# Patient Record
Sex: FEMALE | Race: WHITE | Employment: UNEMPLOYED | URBAN - METROPOLITAN AREA
[De-identification: names, ages, dates, MRNs, and addresses within clinical notes are randomized per-mention and may not be internally consistent; named-entity substitution may affect disease eponyms.]

---

## 2018-07-25 ENCOUNTER — APPOINTMENT (OUTPATIENT)
Dept: CT IMAGING | Age: 43
DRG: 254 | End: 2018-07-25
Attending: INTERNAL MEDICINE
Payer: MEDICAID

## 2018-07-25 ENCOUNTER — HOSPITAL ENCOUNTER (INPATIENT)
Age: 43
LOS: 6 days | Discharge: HOME | DRG: 254 | End: 2018-07-31
Attending: INTERNAL MEDICINE | Admitting: INTERNAL MEDICINE
Payer: MEDICAID

## 2018-07-25 ENCOUNTER — APPOINTMENT (OUTPATIENT)
Dept: NUCLEAR MEDICINE | Age: 43
DRG: 254 | End: 2018-07-25
Attending: INTERNAL MEDICINE
Payer: MEDICAID

## 2018-07-25 ENCOUNTER — APPOINTMENT (OUTPATIENT)
Dept: GENERAL RADIOLOGY | Age: 43
DRG: 254 | End: 2018-07-25
Attending: INTERNAL MEDICINE
Payer: MEDICAID

## 2018-07-25 DIAGNOSIS — D64.9 ANEMIA, UNSPECIFIED TYPE: ICD-10-CM

## 2018-07-25 DIAGNOSIS — K62.5 RECTAL BLEEDING: Primary | ICD-10-CM

## 2018-07-25 DIAGNOSIS — I95.9 TRANSIENT HYPOTENSION: ICD-10-CM

## 2018-07-25 LAB
ALBUMIN SERPL-MCNC: 3.1 G/DL (ref 3.4–5)
ALBUMIN/GLOB SERPL: 1 {RATIO} (ref 0.8–1.7)
ALP SERPL-CCNC: 51 U/L (ref 45–117)
ALT SERPL-CCNC: 14 U/L (ref 13–56)
AMPHET UR QL SCN: NEGATIVE
ANION GAP SERPL CALC-SCNC: 6 MMOL/L (ref 3–18)
APAP SERPL-MCNC: <2 UG/ML (ref 10–30)
APPEARANCE UR: ABNORMAL
APTT PPP: 33.9 SEC (ref 23–36.4)
AST SERPL-CCNC: 11 U/L (ref 15–37)
BACTERIA URNS QL MICRO: ABNORMAL /HPF
BARBITURATES UR QL SCN: NEGATIVE
BASOPHILS # BLD: 0 K/UL (ref 0–0.1)
BASOPHILS NFR BLD: 0 % (ref 0–2)
BENZODIAZ UR QL: NEGATIVE
BILIRUB SERPL-MCNC: 0.5 MG/DL (ref 0.2–1)
BILIRUB UR QL: NEGATIVE
BUN SERPL-MCNC: 11 MG/DL (ref 7–18)
BUN/CREAT SERPL: 12 (ref 12–20)
CALCIUM SERPL-MCNC: 8.6 MG/DL (ref 8.5–10.1)
CANNABINOIDS UR QL SCN: POSITIVE
CHLORIDE SERPL-SCNC: 104 MMOL/L (ref 100–108)
CK MB CFR SERPL CALC: 2 % (ref 0–4)
CK MB SERPL-MCNC: 1.1 NG/ML (ref 5–25)
CK SERPL-CCNC: 55 U/L (ref 26–192)
CO2 SERPL-SCNC: 28 MMOL/L (ref 21–32)
COCAINE UR QL SCN: POSITIVE
COLOR UR: ABNORMAL
CREAT SERPL-MCNC: 0.9 MG/DL (ref 0.6–1.3)
DIFFERENTIAL METHOD BLD: ABNORMAL
EOSINOPHIL # BLD: 0.1 K/UL (ref 0–0.4)
EOSINOPHIL NFR BLD: 1 % (ref 0–5)
EPITH CASTS URNS QL MICRO: ABNORMAL /LPF (ref 0–5)
ERYTHROCYTE [DISTWIDTH] IN BLOOD BY AUTOMATED COUNT: 15.9 % (ref 11.6–14.5)
ETHANOL SERPL-MCNC: <3 MG/DL (ref 0–3)
GLOBULIN SER CALC-MCNC: 3 G/DL (ref 2–4)
GLUCOSE SERPL-MCNC: 130 MG/DL (ref 74–99)
GLUCOSE UR STRIP.AUTO-MCNC: 100 MG/DL
HCG SERPL QL: NEGATIVE
HCT VFR BLD AUTO: 19 % (ref 35–45)
HCT VFR BLD AUTO: 28.9 % (ref 35–45)
HCT VFR BLD AUTO: 31.6 % (ref 35–45)
HDSCOM,HDSCOM: ABNORMAL
HGB BLD-MCNC: 10 G/DL (ref 12–16)
HGB BLD-MCNC: 6 G/DL (ref 12–16)
HGB BLD-MCNC: 9 G/DL (ref 12–16)
HGB UR QL STRIP: ABNORMAL
INR PPP: 1.1 (ref 0.8–1.2)
KETONES UR QL STRIP.AUTO: NEGATIVE MG/DL
LEUKOCYTE ESTERASE UR QL STRIP.AUTO: ABNORMAL
LYMPHOCYTES # BLD: 1.9 K/UL (ref 0.9–3.6)
LYMPHOCYTES NFR BLD: 25 % (ref 21–52)
MAGNESIUM SERPL-MCNC: 1.8 MG/DL (ref 1.6–2.6)
MCH RBC QN AUTO: 25.6 PG (ref 24–34)
MCHC RBC AUTO-ENTMCNC: 31.6 G/DL (ref 31–37)
MCV RBC AUTO: 80.8 FL (ref 74–97)
METHADONE UR QL: NEGATIVE
MONOCYTES # BLD: 0.8 K/UL (ref 0.05–1.2)
MONOCYTES NFR BLD: 10 % (ref 3–10)
NEUTS SEG # BLD: 4.9 K/UL (ref 1.8–8)
NEUTS SEG NFR BLD: 64 % (ref 40–73)
NITRITE UR QL STRIP.AUTO: NEGATIVE
OPIATES UR QL: NEGATIVE
PCP UR QL: NEGATIVE
PH UR STRIP: 7 [PH] (ref 5–8)
PLATELET # BLD AUTO: 325 K/UL (ref 135–420)
PMV BLD AUTO: 9.1 FL (ref 9.2–11.8)
POTASSIUM SERPL-SCNC: 3.5 MMOL/L (ref 3.5–5.5)
PROT SERPL-MCNC: 6.1 G/DL (ref 6.4–8.2)
PROT UR STRIP-MCNC: >1000 MG/DL
PROTHROMBIN TIME: 13.3 SEC (ref 11.5–15.2)
RBC # BLD AUTO: 3.91 M/UL (ref 4.2–5.3)
RBC #/AREA URNS HPF: ABNORMAL /HPF (ref 0–5)
SALICYLATES SERPL-MCNC: 3 MG/DL (ref 2.8–20)
SODIUM SERPL-SCNC: 138 MMOL/L (ref 136–145)
SP GR UR REFRACTOMETRY: >1.03 (ref 1–1.03)
TROPONIN I SERPL-MCNC: <0.02 NG/ML (ref 0–0.06)
UROBILINOGEN UR QL STRIP.AUTO: 0.2 EU/DL (ref 0.2–1)
WBC # BLD AUTO: 7.6 K/UL (ref 4.6–13.2)
WBC URNS QL MICRO: ABNORMAL /HPF (ref 0–5)

## 2018-07-25 PROCEDURE — 74011250636 HC RX REV CODE- 250/636: Performed by: INTERNAL MEDICINE

## 2018-07-25 PROCEDURE — 36430 TRANSFUSION BLD/BLD COMPNT: CPT

## 2018-07-25 PROCEDURE — 85025 COMPLETE CBC W/AUTO DIFF WBC: CPT | Performed by: INTERNAL MEDICINE

## 2018-07-25 PROCEDURE — 86900 BLOOD TYPING SEROLOGIC ABO: CPT | Performed by: INTERNAL MEDICINE

## 2018-07-25 PROCEDURE — 30233N1 TRANSFUSION OF NONAUTOLOGOUS RED BLOOD CELLS INTO PERIPHERAL VEIN, PERCUTANEOUS APPROACH: ICD-10-PCS | Performed by: INTERNAL MEDICINE

## 2018-07-25 PROCEDURE — 80307 DRUG TEST PRSMV CHEM ANLYZR: CPT | Performed by: INTERNAL MEDICINE

## 2018-07-25 PROCEDURE — 81001 URINALYSIS AUTO W/SCOPE: CPT | Performed by: INTERNAL MEDICINE

## 2018-07-25 PROCEDURE — C9113 INJ PANTOPRAZOLE SODIUM, VIA: HCPCS | Performed by: INTERNAL MEDICINE

## 2018-07-25 PROCEDURE — 75810000137 HC PLCMT CENT VENOUS CATH

## 2018-07-25 PROCEDURE — 93005 ELECTROCARDIOGRAM TRACING: CPT

## 2018-07-25 PROCEDURE — 82550 ASSAY OF CK (CPK): CPT | Performed by: INTERNAL MEDICINE

## 2018-07-25 PROCEDURE — 84703 CHORIONIC GONADOTROPIN ASSAY: CPT | Performed by: INTERNAL MEDICINE

## 2018-07-25 PROCEDURE — 74011636320 HC RX REV CODE- 636/320: Performed by: INTERNAL MEDICINE

## 2018-07-25 PROCEDURE — 85018 HEMOGLOBIN: CPT | Performed by: INTERNAL MEDICINE

## 2018-07-25 PROCEDURE — 83735 ASSAY OF MAGNESIUM: CPT | Performed by: INTERNAL MEDICINE

## 2018-07-25 PROCEDURE — P9016 RBC LEUKOCYTES REDUCED: HCPCS | Performed by: INTERNAL MEDICINE

## 2018-07-25 PROCEDURE — 80053 COMPREHEN METABOLIC PANEL: CPT | Performed by: INTERNAL MEDICINE

## 2018-07-25 PROCEDURE — 86920 COMPATIBILITY TEST SPIN: CPT | Performed by: INTERNAL MEDICINE

## 2018-07-25 PROCEDURE — C1751 CATH, INF, PER/CENT/MIDLINE: HCPCS

## 2018-07-25 PROCEDURE — 74011000250 HC RX REV CODE- 250: Performed by: INTERNAL MEDICINE

## 2018-07-25 PROCEDURE — 99285 EMERGENCY DEPT VISIT HI MDM: CPT

## 2018-07-25 PROCEDURE — 85610 PROTHROMBIN TIME: CPT | Performed by: INTERNAL MEDICINE

## 2018-07-25 PROCEDURE — 02HV33Z INSERTION OF INFUSION DEVICE INTO SUPERIOR VENA CAVA, PERCUTANEOUS APPROACH: ICD-10-PCS | Performed by: INTERNAL MEDICINE

## 2018-07-25 PROCEDURE — 74174 CTA ABD&PLVS W/CONTRAST: CPT

## 2018-07-25 PROCEDURE — 71045 X-RAY EXAM CHEST 1 VIEW: CPT

## 2018-07-25 PROCEDURE — 65610000006 HC RM INTENSIVE CARE

## 2018-07-25 PROCEDURE — 85730 THROMBOPLASTIN TIME PARTIAL: CPT | Performed by: INTERNAL MEDICINE

## 2018-07-25 RX ORDER — PANTOPRAZOLE SODIUM 40 MG/10ML
40 INJECTION, POWDER, LYOPHILIZED, FOR SOLUTION INTRAVENOUS
Status: DISCONTINUED | OUTPATIENT
Start: 2018-07-25 | End: 2018-07-25

## 2018-07-25 RX ORDER — SODIUM CHLORIDE 9 MG/ML
250 INJECTION, SOLUTION INTRAVENOUS AS NEEDED
Status: DISCONTINUED | OUTPATIENT
Start: 2018-07-25 | End: 2018-07-31 | Stop reason: HOSPADM

## 2018-07-25 RX ORDER — SODIUM CHLORIDE 9 MG/ML
100 INJECTION, SOLUTION INTRAVENOUS CONTINUOUS
Status: DISCONTINUED | OUTPATIENT
Start: 2018-07-25 | End: 2018-07-27

## 2018-07-25 RX ORDER — DIPHENHYDRAMINE HCL 25 MG
25 CAPSULE ORAL
Status: DISCONTINUED | OUTPATIENT
Start: 2018-07-25 | End: 2018-07-31 | Stop reason: HOSPADM

## 2018-07-25 RX ADMIN — IOPAMIDOL 100 ML: 755 INJECTION, SOLUTION INTRAVENOUS at 18:32

## 2018-07-25 RX ADMIN — SODIUM CHLORIDE 1000 ML: 900 INJECTION, SOLUTION INTRAVENOUS at 20:10

## 2018-07-25 RX ADMIN — SODIUM CHLORIDE 1000 ML: 900 INJECTION, SOLUTION INTRAVENOUS at 15:44

## 2018-07-25 RX ADMIN — SODIUM CHLORIDE 40 MG: 9 INJECTION INTRAMUSCULAR; INTRAVENOUS; SUBCUTANEOUS at 15:46

## 2018-07-25 RX ADMIN — SODIUM CHLORIDE 125 ML/HR: 900 INJECTION, SOLUTION INTRAVENOUS at 21:23

## 2018-07-25 NOTE — IP AVS SNAPSHOT
Ashley Serrano 
 
 
 [address] [facility] [address] 
[phone] Patient: Guillermo Moss MRN: [id number] CAMILA:[birth year]99[birth year]79 A check hyacinth indicates which time of day the medication should be taken. My Medications Notice You have not been prescribed any medications.
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
## 2018-07-25 NOTE — CONSULTS
[facility]  Pulmonary, Critical Care, and Sleep Medicine    Name: Scarlet Nguyen MRN: [id number]   : 1975 Hospital: [facility] [place]   Date: 2018        Pulmonary Critical Care Initial Patient Consult                                              IMPRESSION:   · Lower GI bleeding  · Anemia  · Hx of seizure, took herself off of medications  · Hx of substance abuse  · Hx of right sided pneumothorax  · Nicotine dependence   RECOMMENDATIONS:   Compensated respiratory status. On room air. May use supplemental O2 via NC for goal SPO2> 90%  Aspiration prevention bundle, head of the bed at 30' all times, pulmonary hygiene care  Check CTA abdomen and pelvis if not then consider bleeding scan  Get Gi consultation. C diff ordered  Monitor H/H now and every 6 hrs  Aggressive IV Fluids resuscitation and transfuse PRBC per clinical course  Monitor hemodynamics, UO  Stress ulcer prophylaxis - PPI  DVT prophylaxis - SCDs, avoid pharmacological prophylaxis   AM labs. Diet - NPO   Agree to monitor patient in ICU  Will defer respective systems problem management to primary and other consultant and follow patient in ICU with primary and other medical team  Further recommendations will be based on the patient's response to recommended treatment and results of the investigation ordered. Quality Care: PPI, DVT prophylaxis, HOB elevated, Infection control all reviewed and addressed. PAIN AND SEDATION: none  · Skin/Wound: none  · Nutrition: NPO for now  · Prophylaxis: DVT and GI Prophylaxis reviewed. · Restraints: none  · PT/OT eval and treat: as needed   · Lines/Tubes: lines PIV, durham none, Ventilator none  ADVANCE DIRECTIVE: Full code  FAMILY DISCUSSION: Spoke with patient  Events and notes from last 24 hours reviewed. Care plan discussed with nursing      Subjective/History:   Ms. Scarlet Nguyen has been seen and evaluated as Livier Boyle and Phuong Francois requested now for LGIB with bright red blood per rectum, multiple episodes. Patient is a 37 y.o. female with PMhx for seizure d/o, substance abuse, non-adherence to medication presented to ED with finding of BRBPR. In ED, she had multiple more BM with BRBPR. Some abdominal cramping when she feels BM coming up and relieved after BM. Labs showed some lowering of Hgb compared to her labs in 2017 at [facility]. No prior similar episode, no trauma or sexual abuse, use of foreign body in rectum, hx of PUD or cancer, use of NSAIDs or blood thinner, alcohol abuse, liver disease, fever, chills, abdominal pain, anorexia, nausea or vomiting, dysphagia, recent change in bowel habits or black or bloody stools or weight loss or antibiotics use. She reports last night she spent with a male friend at his house and drank some coffee. She does not recall details of last night events. Further work up is pending. Review of Systems:  Pertinent items are noted in HPI. Past Medical History:  Past Medical History:   Diagnosis Date    History of appendectomy     SAH [facility]arachnoid hemorrhage) ([facility] [address]     Substance abuse         Past Surgical History:  Past Surgical History:   Procedure Laterality Date    HX APPENDECTOMY      HX  SECTION      HX TRACHEOSTOMY          Medications:  Prior to Admission medications    Not on File       Current Facility-Administered Medications   Medication Dose Route Frequency    sodium chloride 0.9 % bolus infusion 1,000 mL  1,000 mL IntraVENous ONCE    sodium chloride 0.9 % bolus infusion 1,000 mL  1,000 mL IntraVENous ONCE    pantoprazole (PROTONIX) 40 mg in sodium chloride 0.9% 10 mL injection  40 mg IntraVENous NOW       Allergy:  No Known Allergies     Social History:  Social History   Substance Use Topics    Smoking status: Current Every Day Smoker     Packs/day: 1.00    Smokeless tobacco: Never Used    Alcohol use Yes        Family History:  History reviewed. Family history of mother with cancer of unknown type.       Objective: Vital Signs:    Blood pressure 122/75, pulse 85, temperature 97.7 °F (36.5 °C), resp. rate 16, height 5' 9\" (1.753 m), weight 49.9 kg (110 lb), last menstrual period 2018, SpO2 100 %. Body mass index is 16.24 kg/(m^2). O2 Device: Room air       Temp (24hrs), Av.7 °F (36.5 °C), Min:97.7 °F (36.5 °C), Max:97.7 °F (36.5 °C)     Patient Vitals for the past 8 hrs:   Temp Pulse Resp BP SpO2   18 1300 97.7 °F (36.5 °C) 85 16 122/75 100 %     Intake/Output:   Last shift:         Last 3 shifts:    No intake or output data in the 24 hours ending 18 1521      Physical Exam:  General: AAO x 3, in no respiratory distress and acyanotic, alert, cooperative, no distress, appears stated age  [de-identified]: PERRLA, EOMI, fundi benign, throat normal without erythema or exudate  Neck: No abnormally enlarged lymph nodes or thyroid, supple  Chest: normal  Lungs: normal air entry, clear to auscultation bilaterally, normal percussion bilaterally, no tenderness/ rash  Heart: Regular rate and rhythm, S1S2 present or without murmur or extra heart sounds  Abdomen: non distended, bowel sounds normoactive, tympanic, abdomen is soft, some sensitivity in both lower quadrants, no significant tenderness, masses, organomegaly or guarding, rigidity, rebound  Extremity: negative for edema, cyanosis, clubbing  Capillary refill: normal  Neuro: alert, oriented x 3, no defects noted in general exam.  Skin: Skin color, texture, turgor fair.  Skin dry, non-diaphoretic    Data:     Recent Results (from the past 24 hour(s))   CBC WITH AUTOMATED DIFF    Collection Time: 18  1:15 PM   Result Value Ref Range    WBC 7.6 4.6 - 13.2 K/uL    RBC 3.91 (L) 4.20 - 5.30 M/uL    HGB 10.0 (L) 12.0 - 16.0 g/dL    HCT 31.6 (L) 35.0 - 45.0 %    MCV 80.8 74.0 - 97.0 FL    MCH 25.6 24.0 - 34.0 PG    MCHC 31.6 31.0 - 37.0 g/dL    RDW 15.9 (H) 11.6 - 14.5 %    PLATELET 694 933 - 943 K/uL    MPV 9.1 (L) 9.2 - 11.8 FL    NEUTROPHILS 64 40 - 73 % LYMPHOCYTES 25 21 - 52 %    MONOCYTES 10 3 - 10 %    EOSINOPHILS 1 0 - 5 %    BASOPHILS 0 0 - 2 %    ABS. NEUTROPHILS 4.9 1.8 - 8.0 K/UL    ABS. LYMPHOCYTES 1.9 0.9 - 3.6 K/UL    ABS. MONOCYTES 0.8 0.05 - 1.2 K/UL    ABS. EOSINOPHILS 0.1 0.0 - 0.4 K/UL    ABS. BASOPHILS 0.0 0.0 - 0.1 K/UL    DF AUTOMATED     METABOLIC PANEL, COMPREHENSIVE    Collection Time: 07/25/18  1:15 PM   Result Value Ref Range    Sodium 138 136 - 145 mmol/L    Potassium 3.5 3.5 - 5.5 mmol/L    Chloride 104 100 - 108 mmol/L    CO2 28 21 - 32 mmol/L    Anion gap 6 3.0 - 18 mmol/L    Glucose 130 (H) 74 - 99 mg/dL    BUN 11 7.0 - 18 MG/DL    Creatinine 0.90 0.6 - 1.3 MG/DL    BUN/Creatinine ratio 12 12 - 20      GFR est AA >60 >60 ml/min/1.73m2    GFR est non-AA >60 >60 ml/min/1.73m2    Calcium 8.6 8.5 - 10.1 MG/DL    Bilirubin, total 0.5 0.2 - 1.0 MG/DL    ALT (SGPT) 14 13 - 56 U/L    AST (SGOT) 11 (L) 15 - 37 U/L    Alk.  phosphatase 51 45 - 117 U/L    Protein, total 6.1 (L) 6.4 - 8.2 g/dL    Albumin 3.1 (L) 3.4 - 5.0 g/dL    Globulin 3.0 2.0 - 4.0 g/dL    A-G Ratio 1.0 0.8 - 1.7     MAGNESIUM    Collection Time: 07/25/18  1:15 PM   Result Value Ref Range    Magnesium 1.8 1.6 - 2.6 mg/dL   CARDIAC PANEL,(CK, CKMB & TROPONIN)    Collection Time: 07/25/18  1:15 PM   Result Value Ref Range    CK 55 26 - 192 U/L    CK - MB 1.1 <3.6 ng/ml    CK-MB Index 2.0 0.0 - 4.0 %    Troponin-I, Qt. <0.02 0.00 - 0.06 NG/ML   PROTHROMBIN TIME + INR    Collection Time: 07/25/18  1:15 PM   Result Value Ref Range    Prothrombin time 13.3 11.5 - 15.2 sec    INR 1.1 0.8 - 1.2     PTT    Collection Time: 07/25/18  1:15 PM   Result Value Ref Range    aPTT 33.9 23.0 - 36.4 SEC   ETHYL ALCOHOL    Collection Time: 07/25/18  1:15 PM   Result Value Ref Range    ALCOHOL(ETHYL),SERUM <3 0 - 3 MG/DL   HCG QL SERUM    Collection Time: 07/25/18  1:15 PM   Result Value Ref Range    HCG, Ql. NEGATIVE  NEG     SALICYLATE    Collection Time: 07/25/18  1:15 PM   Result Value Ref Range Salicylate level 3.0 2.8 - 20 MG/DL   TYPE & SCREEN    Collection Time: 07/25/18  1:30 PM   Result Value Ref Range    Crossmatch Expiration 07/28/2018     ABO/Rh(D) O POSITIVE     Antibody screen NEG            No results for input(s): FIO2I, IFO2, HCO3I, IHCO3, HCOPOC, PCO2I, PCOPOC, IPHI, PHI, PHPOC, PO2I, PO2POC in the last 72 hours. No lab exists for component: IPOC2    All Micro Results     None          Telemetry: normal sinus rhythm      [x]See my orders for details    My assessment, plan of care, findings, medications, side effects etc were discussed with:  [x]nursing []PT/OT    []respiratory therapy [x]Dr. Janie Hearn [x]Patient     [x]Total critical care time exclusive of procedures 50 minutes with complex decision making performed and > 50% time spent in face to face evaluation. Ricardo William MD    Addendum 4:45 pm  Came to check on the patient. In between patient had several more bloody bowel movements with total now 6-7 or more, SBP dropped to 97 mm Hg and after IVF bolus 100 mm Hg. Given the ongoing bleeding with drop in Hgb and relative changes in BP, patient would benefit from PRBC transfusion. Discussed with patient. All questions were answered. She agrees. Order for transfusion placed on the chart. Ricardo William MD     Addendum 6 pm  Repeat Hgb with further drop to 9. Patient continued with another large BRBPR and dropped SBP to 67 mm Hg. Patient has one intermittently working PIV and another one not working per [place]. IVF bolus started and could bring SBP to 77 mm Hg and trouble with getting IV fluid in. Spoke with patient regarding CVC placement.  Standard discussion of CVC placement alternative, risk/complication with the patient includes but not limited to bleeding, infection, not able to find anything, injury to lungs and nearby organs, need for chest tube for air leak, need for more procedures/ surgeries, respiratory failure, medications side effects, discomfort and any unforeseen adverse events. All questions answered. Patient consented for CVC placement. Please see separate procedure note. SBP with IVF up to 88-92 mm Hg. Awaiting PRBC from blood bank. Lorie Do MD     Addendum 10:10 pm  CTA abdomen pelvis followed. RN earlier has been instructed to check with GI regarding clarification on NM bleeding scan. Patient going to receive PRBC transfusion. Has instructed RN to transfuse 1 unit then check Hgb and transfuse 2nd unit for either ongoing bleeding episodes, hemodynamic instability and or Hgb < 7.     Lorie Do MD

## 2018-07-25 NOTE — PROGRESS NOTES
Chart reviewed. Pt currently in ED awaiting hospital admission for anemia. CM will follow for discharge planning needs.     Care Management Interventions  Transition of Care Consult (CM Consult): Discharge Planning

## 2018-07-25 NOTE — IP AVS SNAPSHOT
[address] [address] [place] [address] 
[phone] Patient: Cozette Castleman MRN: [id number] RUJ:9/87/9260 About your hospitalization You were admitted on:  July 25, 2018 You last received care in the:  [address] [place] [address] You were discharged on:  July 31, 2018 Why you were hospitalized Your primary diagnosis was:  Rectal Bleed Your diagnoses also included:  Anemia, Transient Hypotension, Substance Abuse, Smoker, Adjustment Disorder With Mixed Anxiety And Depressed Mood, Hypomagnesemia, Hypophosphatemia, Cellulitis Of Right Arm, Vaginal Irritation Follow-up Information Follow up With Details Comments Contact Info Caroline Hernandez MD   [address] [place] [address] [address] [place] [address] 
[phone] None   None (395) Patient stated that they have no PCP 
  
 pcm  In 3 days Discharge Orders None A check hyacinth indicates which time of day the medication should be taken. My Medications Notice You have not been prescribed any medications. Discharge Instructions Anemia: Care Instructions Your Care Instructions Anemia is a low level of red blood cells, which carry oxygen throughout your body. Many things can cause anemia. Lack of iron is one of the most common causes. Your body needs iron to make hemoglobin, a substance in red blood cells that carries oxygen from the lungs to your body's cells. Without enough iron, the body produces fewer and smaller red blood cells. As a result, your body's cells do not get enough oxygen, and you feel tired and weak. And you may have trouble concentrating. Bleeding is the most common cause of a lack of iron. You may have heavy menstrual bleeding or bleeding caused by conditions such as ulcers, hemorrhoids, or cancer.  Regular use of aspirin or other anti-inflammatory medicines (such as ibuprofen) also can cause bleeding in some people. A lack of iron in your diet also can cause anemia, especially at times when the body needs more iron, such as during pregnancy, infancy, and the teen years. Your doctor may have prescribed iron pills. It may take several months of treatment for your iron levels to return to normal. Your doctor also may suggest that you eat foods that are rich in iron, such as meat and beans. There are many other causes of anemia. It is not always due to a lack of iron. Finding the specific cause of your anemia will help your doctor find the right treatment for you. Follow-up care is a key part of your treatment and safety. Be sure to make and go to all appointments, and call your doctor if you are having problems. It's also a good idea to know your test results and keep a list of the medicines you take. How can you care for yourself at home? · Take your medicines exactly as prescribed. Call your doctor if you think you are having a problem with your medicine. · If your doctor recommends iron pills, take them as directed: ¨ Try to take the pills on an empty stomach about 1 hour before or 2 hours after meals. But you may need to take iron with food to avoid an upset stomach. ¨ Do not take antacids or drink milk or caffeine drinks (such as coffee, tea, or cola) at the same time or within 2 hours of the time that you take your iron. They can make it hard for your body to absorb the iron. ¨ Vitamin C (from food or supplements) helps your body absorb iron. Try taking iron pills with a glass of orange juice or some other food that is high in vitamin C, such as citrus fruits. ¨ Iron pills may cause stomach problems, such as heartburn, nausea, diarrhea, constipation, and cramps. Be sure to drink plenty of fluids, and include fruits, vegetables, and fiber in your diet each day. Iron pills often make your bowel movements dark or green. ¨ If you forget to take an iron pill, do not take a double dose of iron the next time you take a pill. ¨ Keep iron pills out of the reach of small children. An overdose of iron can be very dangerous. · Follow your doctor's advice about eating iron-rich foods. These include red meat, shellfish, poultry, eggs, beans, raisins, whole-grain bread, and leafy green vegetables. · Steam vegetables to help them keep their iron content. When should you call for help? Call 911 anytime you think you may need emergency care. For example, call if: 
  · You have symptoms of a heart attack. These may include: ¨ Chest pain or pressure, or a strange feeling in the chest. 
¨ Sweating. ¨ Shortness of breath. ¨ Nausea or vomiting. ¨ Pain, pressure, or a strange feeling in the back, neck, jaw, or upper belly or in one or both shoulders or arms. ¨ Lightheadedness or sudden weakness. ¨ A fast or irregular heartbeat. After you call 911, the  may tell you to chew 1 adult-strength or 2 to 4 low-dose aspirin. Wait for an ambulance. Do not try to drive yourself.  
  · You passed out (lost consciousness).  
 Call your doctor now or seek immediate medical care if: 
  · You have new or increased shortness of breath.  
  · You are dizzy or lightheaded, or you feel like you may faint.  
  · Your fatigue and weakness continue or get worse.  
  · You have any abnormal bleeding, such as: 
¨ Nosebleeds. ¨ Vaginal bleeding that is different (heavier, more frequent, at a different time of the month) than what you are used to. ¨ Bloody or black stools, or rectal bleeding. ¨ Bloody or pink urine.  
 Watch closely for changes in your health, and be sure to contact your doctor if: 
  · You do not get better as expected. Where can you learn more? Go to http://darron-viry.info/. Enter R301 in the search box to learn more about \"Anemia: Care Instructions. \" Current as of: October 9, 2017 Content Version: 11.7 © 2620-9570 Healthwise, Incorporated. Care instructions adapted under license by [employer] (which disclaims liability or warranty for this information). If you have questions about a medical condition or this instruction, always ask your healthcare professional. [address] any warranty or liability for your use of this information. DISCHARGE SUMMARY from Nurse PATIENT INSTRUCTIONS: 
 
 
F-face looks uneven A-arms unable to move or move unevenly S-speech slurred or non-existent T-time-call 911 as soon as signs and symptoms begin-DO NOT go Back to bed or wait to see if you get better-TIME IS BRAIN. Warning Signs of HEART ATTACK Call 911 if you have these symptoms: 
? Chest discomfort. Most heart attacks involve discomfort in the center of the chest that lasts more than a few minutes, or that goes away and comes back. It can feel like uncomfortable pressure, squeezing, fullness, or pain. ? Discomfort in other areas of the upper body. Symptoms can include pain or discomfort in one or both arms, the back, neck, jaw, or stomach. ? Shortness of breath with or without chest discomfort. ? Other signs may include breaking out in a cold sweat, nausea, or lightheadedness. Don't wait more than five minutes to call [address]! Fast action can save your life. Calling 911 is almost always the fastest way to get lifesaving treatment. Emergency Medical Services staff can begin treatment when they arrive  up to an hour sooner than if someone gets to the hospital by car. The discharge information has been reviewed with the patient. The patient verbalized understanding.  
Discharge medications reviewed with the patient and appropriate educational materials and side effects teaching were provided. ___________________________________________________________________________________________________________________________________ Introducing [facility]! Ole Gao introduces Rede[employer] patient portal. Now you can access parts of your medical record, email your doctor's office, and request medication refills online. 1. In your internet browser, go to [email or web address] 2. Click on the First Time User? Click Here link in the Sign In box. You will see the New Member Sign Up page. 3. Enter your Redeemia Access Code exactly as it appears below. You will not need to use this code after youve completed the sign-up process. If you do not sign up before the expiration date, you must request a new code. · Rede[employer] Access Code: [email or web address] Expires: 10/23/2018 12:55 PM 
 
4. Enter the last four digits of your Social Security Number (xxxx) and Date of Birth (mm/dd/yyyy) as indicated and click Submit. You will be taken to the next sign-up page. 5. Create a [email or web address] ID. This will be your Redeemia login ID and cannot be changed, so think of one that is secure and easy to remember. 6. Create a Redeemia password. You can change your password at any time. 7. Enter your Password Reset Question and Answer. This can be used at a later time if you forget your password. 8. Enter your e-mail address. You will receive e-mail notification when new information is available in [address] 9. Click Sign Up. You can now view and download portions of your medical record. 10. Click the Download Summary menu link to download a portable copy of your medical information. If you have questions, please visit the Frequently Asked Questions section of the Redeemia website. Remember, Red[employer] is NOT to be used for urgent needs. For medical emergencies, dial 911. Now available from your iPhone and Android! Introducing Sriram Chilel As a [facility] patient, I wanted to make you aware of our electronic visit tool called Sriram Chilel. [facility] 24/7 allows you to connect within minutes with a medical provider 24 hours a day, seven days a week via a mobile device or tablet or logging into a secure website from your computer. You can access Sriram Chilel from anywhere in the [place]. A virtual visit might be right for you when you have a simple condition and feel like you just dont want to get out of bed, or cant get away from work for an appointment, when your regular [facility] provider is not available (evenings, weekends or holidays), or when youre out of town and need minor care. Electronic visits cost only $49 and if the [facility] 24/7 provider determines a prescription is needed to treat your condition, one can be electronically transmitted to a nearby pharmacy*. Please take a moment to enroll today if you have not already done so. The enrollment process is free and takes just a few minutes. To enroll, please download the [employer] 24/7 nikolay to your tablet or phone, or visit [email or web address] to enroll on your computer. And, as an [address] [place] [address] patient with a [employer] account, the results of your visits will be scanned into your electronic medical record and your primary care provider will be able to view the scanned results. We urge you to continue to see your regular [facility] provider for your ongoing medical care. And while your primary care provider may not be the one available when you seek a Sriram Chilel virtual visit, the peace of mind you get from getting a real diagnosis real time can be priceless. For more information on Sriram Chilel, view our Frequently Asked Questions (FAQs) at [email or web address]. Sincerely, 
 
Jodie Lechuga MD 
Chief Medical Officer [employer] [facility] *:  certain medications cannot be prescribed via Sriram Chilel Unresulted Labs-Please follow up with your PCP about these lab tests Order Current Status Lovenia Amend / GC-AMPLIFIED In process CULTURE, BLOOD Preliminary result CULTURE, BLOOD Preliminary result CULTURE, BLOOD Preliminary result CULTURE, BLOOD Preliminary result Providers Seen During Your Hospitalization Provider Specialty Primary office phone Ren Markham MD Emergency Medicine [phone] Your Primary Care Physician (PCP) Primary Care Physician Office Phone Office Fax NONE ** None ** ** None ** You are allergic to the following No active allergies Recent Documentation Height Weight BMI OB Status Smoking Status 1.753 m 55.3 kg 18 kg/m2 Having regular periods Current Every Day Smoker Emergency Contacts Name Discharge Info Relation Home Work Mobile [address] [facility] CAREGIVER [3] Mother [14] [phone] Patient Belongings The following personal items are in your possession at time of discharge: 
     Visual Aid: None Please provide this summary of care documentation to your next provider. Signatures-by signing, you are acknowledging that this After Visit Summary has been reviewed with you and you have received a copy. Patient Signature:  ____________________________________________________________ Date:  ____________________________________________________________  
  
Vaishali Cat Provider Signature:  ____________________________________________________________ Date:  ____________________________________________________________
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
## 2018-07-25 NOTE — PROGRESS NOTES
2000 - Verbal transfer report given to Edgar Fuller RN (oncoming nurse) by Sima Keating RN (offgoing nurse). Report included the following informatiNSR/ST PVCsSBAR, Kardex, ED Summary, Intake/Output, MAR, Accordion, Recent Results, Med Rec Status and Cardiac Rhythm NSR/ST PVCs. 1 Unit Blood product ready. 2030 - pt transferred from ED. Pt assessed and vitals obtained. Pt complains of abdominal pain, endorses mild dizziness, headache, and numbness in the toes. Vitals stable at this time. Pt very anxious and asks for food. Will continue to monitor, see EMR for full details. Spoke with Dr Nayla Ritter about whether or not to give 2 units, order received for pt to get first unit and then assess H&H and BP.  2100 - Paged Dr Leander Lopez. Pt refused NM scan \"I am too tired and in too much pain. I just want to sleep until tomorrow\". Pt educated about concern for bleeding overnight and that the scan would allow for better intervention. Pt still refused. 2130 - First unit of blood started. 18 - Dr Gilbert Spearaminta at the bedside. States pt needs to get both units of blood. Current H&H is 6.0. Unit transfusing. Colonoscopy set for Friday. 0000 - Pt assessed and vitals obtained. Pt much unchanged,resting in bed, no acute pain at this time. No blood BMs in ICU. Vitals remain the same. Unit finished transfusing. Will continue to monitor, see EMR for full details. 36 - Second unit begun. 0400 - Second unit completed. Pt assessed and vitals obtained. No transfusion reaction suspected. Pt sleeping comfortably in bed, answers questions, denies pain. No acute changes in vitals and no further bloody BMs at this time. Will continue to monitor, see EMR for full details. 36 - Spoke to Dr Leander Lopez, pt placed on all electrolyte replacement protocols.

## 2018-07-25 NOTE — PROCEDURES
[employer] Pulmonary Specialist  [facility] Procedure Note    Indication: Inadequate venous access, hypotension with LGIB    Risks, benefits, alternatives explained and consent obtained. Time out was performed to verify the correct patient, procedure, equipment, staff and marking as appropriate. Patient positioned in Trendelenburg. Central line Bundle:  Full sterile barrier precautions used. 7-Step Sterility Protocol followed. (cap, mask sterile gown, sterile gloves, large sterile sheet, hand hygiene, 2% chlorhexidine for cutaneous antisepsis)  5 mL 1% Lidocaine placed at insertion site. Using ultrasound guidance, Right internal jugular cannulated x 1 attempt(s) utilizing the modified Seldinger technique. Position of wire confirmed in vein using US before dilating. Wire was removed. Central venous catheter was placed with no difficulty. Good blood return. No Hematoma. Catheter secured & Biopatch applied. Sterile Tegaderm placed. CXR pending. Karlos Villalpando MD   6:15 PM    Addendum 6:20 pm  CXR port [prelimianry]  No pneumothorax. R IJ CVC in place. Notified staff to start using CVC.         Karlos Villalpando MD
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
## 2018-07-25 NOTE — ED NOTES
Pt had multiple bloody stools, pt up to bedside commode 5-6 times. Moderate to large amount of bloody stools noted, all to be bright red.    Pt denies any pain during episodes of BM

## 2018-07-26 PROBLEM — F17.200 SMOKER: Status: ACTIVE | Noted: 2018-07-26

## 2018-07-26 PROBLEM — F19.10 SUBSTANCE ABUSE (HCC): Status: ACTIVE | Noted: 2018-07-26

## 2018-07-26 LAB
ALBUMIN SERPL-MCNC: 2 G/DL (ref 3.4–5)
ALBUMIN/GLOB SERPL: 1 {RATIO} (ref 0.8–1.7)
ALP SERPL-CCNC: 32 U/L (ref 45–117)
ALT SERPL-CCNC: 9 U/L (ref 13–56)
ANION GAP SERPL CALC-SCNC: 5 MMOL/L (ref 3–18)
AST SERPL-CCNC: 9 U/L (ref 15–37)
BILIRUB SERPL-MCNC: 0.4 MG/DL (ref 0.2–1)
BUN SERPL-MCNC: 5 MG/DL (ref 7–18)
BUN/CREAT SERPL: 7 (ref 12–20)
CA-I SERPL-SCNC: 1.13 MMOL/L (ref 1.12–1.32)
CALCIUM SERPL-MCNC: 7.3 MG/DL (ref 8.5–10.1)
CHLORIDE SERPL-SCNC: 110 MMOL/L (ref 100–108)
CO2 SERPL-SCNC: 25 MMOL/L (ref 21–32)
CREAT SERPL-MCNC: 0.69 MG/DL (ref 0.6–1.3)
ERYTHROCYTE [DISTWIDTH] IN BLOOD BY AUTOMATED COUNT: 15.6 % (ref 11.6–14.5)
GLOBULIN SER CALC-MCNC: 2 G/DL (ref 2–4)
GLUCOSE SERPL-MCNC: 77 MG/DL (ref 74–99)
HCT VFR BLD AUTO: 23.4 % (ref 35–45)
HCT VFR BLD AUTO: 24.5 % (ref 35–45)
HCT VFR BLD AUTO: 25.4 % (ref 35–45)
HGB BLD-MCNC: 7.6 G/DL (ref 12–16)
HGB BLD-MCNC: 7.9 G/DL (ref 12–16)
HGB BLD-MCNC: 8.3 G/DL (ref 12–16)
MAGNESIUM SERPL-MCNC: 1.7 MG/DL (ref 1.6–2.6)
MAGNESIUM SERPL-MCNC: 1.9 MG/DL (ref 1.6–2.6)
MCH RBC QN AUTO: 26.3 PG (ref 24–34)
MCHC RBC AUTO-ENTMCNC: 32.2 G/DL (ref 31–37)
MCV RBC AUTO: 81.7 FL (ref 74–97)
PHOSPHATE SERPL-MCNC: 2.7 MG/DL (ref 2.5–4.9)
PLATELET # BLD AUTO: 193 K/UL (ref 135–420)
PMV BLD AUTO: 8.7 FL (ref 9.2–11.8)
POTASSIUM SERPL-SCNC: 3.6 MMOL/L (ref 3.5–5.5)
POTASSIUM SERPL-SCNC: 4.2 MMOL/L (ref 3.5–5.5)
PROT SERPL-MCNC: 4 G/DL (ref 6.4–8.2)
RBC # BLD AUTO: 3 M/UL (ref 4.2–5.3)
SODIUM SERPL-SCNC: 140 MMOL/L (ref 136–145)
WBC # BLD AUTO: 6.1 K/UL (ref 4.6–13.2)

## 2018-07-26 PROCEDURE — P9016 RBC LEUKOCYTES REDUCED: HCPCS | Performed by: INTERNAL MEDICINE

## 2018-07-26 PROCEDURE — 74011250637 HC RX REV CODE- 250/637: Performed by: INTERNAL MEDICINE

## 2018-07-26 PROCEDURE — 83735 ASSAY OF MAGNESIUM: CPT | Performed by: INTERNAL MEDICINE

## 2018-07-26 PROCEDURE — 85018 HEMOGLOBIN: CPT | Performed by: INTERNAL MEDICINE

## 2018-07-26 PROCEDURE — 36430 TRANSFUSION BLD/BLD COMPNT: CPT

## 2018-07-26 PROCEDURE — 84132 ASSAY OF SERUM POTASSIUM: CPT | Performed by: INTERNAL MEDICINE

## 2018-07-26 PROCEDURE — 74011250637 HC RX REV CODE- 250/637: Performed by: HOSPITALIST

## 2018-07-26 PROCEDURE — 65610000006 HC RM INTENSIVE CARE

## 2018-07-26 PROCEDURE — C9113 INJ PANTOPRAZOLE SODIUM, VIA: HCPCS | Performed by: INTERNAL MEDICINE

## 2018-07-26 PROCEDURE — 74011250636 HC RX REV CODE- 250/636: Performed by: HOSPITALIST

## 2018-07-26 PROCEDURE — 82330 ASSAY OF CALCIUM: CPT | Performed by: INTERNAL MEDICINE

## 2018-07-26 PROCEDURE — 77030032490 HC SLV COMPR SCD KNE COVD -B

## 2018-07-26 PROCEDURE — 74011250636 HC RX REV CODE- 250/636: Performed by: INTERNAL MEDICINE

## 2018-07-26 PROCEDURE — 74011000250 HC RX REV CODE- 250: Performed by: INTERNAL MEDICINE

## 2018-07-26 PROCEDURE — 85027 COMPLETE CBC AUTOMATED: CPT | Performed by: INTERNAL MEDICINE

## 2018-07-26 PROCEDURE — 84100 ASSAY OF PHOSPHORUS: CPT | Performed by: INTERNAL MEDICINE

## 2018-07-26 RX ORDER — IBUPROFEN 200 MG
1 TABLET ORAL DAILY
Status: DISCONTINUED | OUTPATIENT
Start: 2018-07-26 | End: 2018-07-31 | Stop reason: HOSPADM

## 2018-07-26 RX ORDER — POTASSIUM CHLORIDE 20 MEQ/1
40 TABLET, EXTENDED RELEASE ORAL ONCE
Status: COMPLETED | OUTPATIENT
Start: 2018-07-26 | End: 2018-07-26

## 2018-07-26 RX ORDER — MAGNESIUM SULFATE 1 G/100ML
1 INJECTION INTRAVENOUS ONCE
Status: COMPLETED | OUTPATIENT
Start: 2018-07-26 | End: 2018-07-27

## 2018-07-26 RX ORDER — METOCLOPRAMIDE HYDROCHLORIDE 5 MG/ML
5 INJECTION INTRAMUSCULAR; INTRAVENOUS
Status: DISCONTINUED | OUTPATIENT
Start: 2018-07-26 | End: 2018-07-31 | Stop reason: HOSPADM

## 2018-07-26 RX ORDER — LORAZEPAM 2 MG/ML
1 INJECTION INTRAMUSCULAR
Status: DISCONTINUED | OUTPATIENT
Start: 2018-07-26 | End: 2018-07-31 | Stop reason: HOSPADM

## 2018-07-26 RX ADMIN — POTASSIUM CHLORIDE 40 MEQ: 20 TABLET, EXTENDED RELEASE ORAL at 08:05

## 2018-07-26 RX ADMIN — SODIUM CHLORIDE 40 MG: 9 INJECTION INTRAMUSCULAR; INTRAVENOUS; SUBCUTANEOUS at 08:28

## 2018-07-26 RX ADMIN — POLYETHYLENE GLYCOL 3350, SODIUM CHLORIDE, POTASSIUM CHLORIDE, SODIUM BICARBONATE, AND SODIUM SULFATE 4000 ML: 240; 5.84; 2.98; 6.72; 22.72 POWDER, FOR SOLUTION ORAL at 22:11

## 2018-07-26 RX ADMIN — SODIUM CHLORIDE 100 ML/HR: 900 INJECTION, SOLUTION INTRAVENOUS at 14:47

## 2018-07-26 RX ADMIN — SODIUM CHLORIDE 125 ML/HR: 900 INJECTION, SOLUTION INTRAVENOUS at 05:02

## 2018-07-26 RX ADMIN — MAGNESIUM SULFATE HEPTAHYDRATE 1 G: 1 INJECTION, SOLUTION INTRAVENOUS at 07:01

## 2018-07-26 RX ADMIN — LORAZEPAM 1 MG: 2 INJECTION INTRAMUSCULAR; INTRAVENOUS at 11:45

## 2018-07-26 RX ADMIN — ACETAMINOPHEN 650 MG: 325 SOLUTION ORAL at 11:44

## 2018-07-26 NOTE — PROGRESS NOTES
INITIAL NUTRITION ASSESSMENT     RECOMMENDATIONS/PLAN:   Adv diet per MD  Avoid prolonged Clear Liquid diet as this does not meet estimated needs  Add Ensure Clear TID  Monitor labs/lytes, diet adv, skin integrity, wt, fluid status, BM  Adult Malnutrition Criteria:      Nutrition assessment was completed by RDN and the patient was found to meet the following malnutrition criteria established by ASPEN/AND:    Adult Malnutrition Guidelines:  SEVERE PROTEIN CALORIE MALNUTRITION IN THE CONTEXT OF SOCIAL/BEHAVIORAL/ENVIRONMENTAL CIRCUMSTANCES  Energy Intake: <50% energy intake compared to estimated needs >1 month  Body Fat: Severe Depletion  Muscle Mass: Severe Depletion  Renee HUDSON [employer]iab[employer]ar[employer]uel  07/26/18        REASON FOR ASSESSMENT:     [] Positive Nutrition Screen:  [x] BMI <18.5    NUTRITION ASSESSMENT:   Client History: 37 yrs old Female admitted with gi bleeding, substance abuse, transient hypotension, anemia. Awaiting colonoscopy scheduled for Friday. CTA complete      PMHx: appendectomy, SAH, substance abuse    Cultural/Jehovah's witness Food Preferences: None Identified    FOOD/NUTRITION HISTORY  Diet History: unable to obtain at this time. Nurse reports pt is very thin w/ temporal wasting   Food Allergies:  [x] NKFA      Pertinent PTA Medications: none on file      NUTRITION INTAKE   Diet Order:  Clear Liquid       Average PO Intake:       No data found.      Pertinent Medications:  [x] Reviewed; protonix   Electrolyte Replacement Protocol: [x]K  [x]Mg  [x]PO4    Insulin:  [] SSI  [] Pre-meal   []  Basal   [] Drip  [] None  Pt expected to meet estimated nutrient needs through next review:          []  Yes     [x] No Clear Liquid does not meet estimated needs ANTHROPOMETRICS  Height: 5' 9\" (175.3 cm)       Weight: 49.9 kg (110 lb)    BMI: 16.2 kg/m^2  -  underweight (Less than or = to 18.5% BMI)        Weight change: pt was 125# back in 9/27/17 currently pt is 110# which is not a signficant wt loss Comparison to Reference Standards:  IBW: 145 lbs      %IBW: 76%      AdjBW: n/a    NUTRITION-FOCUSED PHYSICAL ASSESSMENT  Skin: No PU. GI: +BM 7/25/18-bloody     BIOCHEMICAL DATA & MEDICAL TESTS  Pertinent Labs:  [x] Reviewed;      NUTRITION PRESCRIPTION  Calories: 6720-9385 kcal/day based on 30-35kcal/kg   Protein: 60-75 g/day based on 1.2-1.5 g/kg  CHO: 188-219 g/day based on 50% of total energy  Fluid: 6531-6964 ml/day based on 1 kcal/ml      NUTRITION DIAGNOSES:   1. Predicted suboptimal energy intake related to inadequate oral intake as evidence by BMI 16.2    NUTRITION INTERVENTIONS:   INTERVENTIONS:        GOALS:  1. Other:Adv diet per MD 1. Adv diet per MD by next review 3 days   2. Commercial Supplement:ensure clear TID          LEARNING NEEDS (Diet, Supplementation, Food/Nutrient-Drug Interaction):   [x] None Identified  [] Inpatient education provided/documented    [] Identified and patient:  [] Declined     [] Was not appropriate/indicated  NUTRITION MONITORING /EVALUATION:   Monitor wt  Monitor renal labs, electrolytes, fluid status    [] Participated in Interdisciplinary Rounds  [x] Interdisciplinary Care Plan Reviewed/Documented  DISCHARGE NUTRITION RECOMMENDATIONS ADDRESSED:      [x] To be determined closer to discharge    NUTRITION RISK:     [x]  At risk                     []  Not currently at risk     Will follow-up per policy.   Molly Castellanos, [id number] [address]

## 2018-07-26 NOTE — PROGRESS NOTES
Severe Protein Calorie Malnutrition -cdmp  Hypovolemic/Hemorrhagic shock in the setting of severe GI bleed with significant hypotension requiring aggressive IV fluids and PRBC's-unable to determine

## 2018-07-26 NOTE — DIABETES MGMT
GLYCEMIC CONTROL PROGRESS NOTE:    -discussed in rounds, no known h/o DM  -no identified GC needs at this time    Recent Glucose Results:   Lab Results   Component Value Date/Time    GLU 77 07/26/2018 06:01 AM     (H) 07/25/2018 01:15 PM     Reta Pabon RN, MS  Glycemic Control Team  Pager [phone] (M-TH 8:30-5P)  *After Hours pager [phone]

## 2018-07-26 NOTE — CDMP QUERY
Please clarify if this patient is being treated/managed for:    => Hypovolemic/Hemorrhagic shock in the setting of severe GI bleed with significant hypotension requiring aggressive IV fluids and PRBC's  =>Other Explanation of clinical findings  =>Unable to Determine (no explanation of clinical findings)    The medical record reflects the following:    Risk: Presented w/ BRBPR, +UDS for [facility] and cocaine    Clinical Indicators: Patient presented with mulitple episodes of BRBPR. Her BP dropped to 64/42, MAP 49, P 104. H/H dropped from 10/31.6 to 6.0/19.0    Treatment: CT abd, 2L NS IV fluid boluses, central line placed, 2u. PRBC's, Admit to ICU,  GI consult, frequent monitoring of VS and H/H    Please clarify and document your clinical opinion in the progress notes and discharge summary including the definitive and/or presumptive diagnosis, (suspected or probable), related to the above clinical findings. Please include clinical findings supporting your diagnosis. If you DECLINE this query or would like to communicate with [facility], please utilize the \"[facility] message box\" at the TOP of the Progress Note on the right.       Thank you,  Franklin Davis [facility], [address]

## 2018-07-26 NOTE — CDMP QUERY
Please clarify if this patient is being treated/managed for:    => Severe Protein Calorie Malnutrition as evidenced by low BMI as per Nutrition consult  =>Other Explanation of clinical findings  =>Unable to Determine (no explanation of clinical findings)    The medical record reflects the following:    Risk:  h/o substance abuse    Clinical Indicators: Patient presented w/ rectal bleeding. She was also noted to have BMI 16.24. Nutrition assessment was completed by AIDAN and the patient was found to meet the following malnutrition criteria established by ASPEN/AND:     Adult Malnutrition Guidelines:  SEVERE PROTEIN CALORIE MALNUTRITION IN THE CONTEXT OF SOCIAL/BEHAVIORAL/ENVIRONMENTAL CIRCUMSTANCES  Energy Intake: <50% energy intake compared to estimated needs >1 month  Body Fat: Severe Depletion  Muscle Mass: Severe Depletion    Treatment:  Nutrition consult, advance diet as appropriate, Recommend Ensure clear TID    Please clarify and document your clinical opinion in the progress notes and discharge summary including the definitive and/or presumptive diagnosis, (suspected or probable), related to the above clinical findings. Please include clinical findings supporting your diagnosis. If you DECLINE this query or would like to communicate with [facility], please utilize the \"[employer] message box\" at the TOP of the Progress Note on the right.       Thank you,    Carol Dolan [address] [place] [address], [address] [place] [address] Ne

## 2018-07-26 NOTE — PROGRESS NOTES
Hospitalist Progress Note-critical care note Patient: Sterling Tran MRN: [id number]  CSN: [id number] YOB: 1975  Age: 37 y.o. Sex: female DOA: 7/25/2018 LOS:  LOS: 1 day Chief complaint: gi bleeding, smoker , substance abuse , transient hypotension , anemia Assessment/Plan Hospital Problems  Date Reviewed: 11/23/2013 Codes Class Noted POA Substance abuse ICD-10-CM: F19.10 ICD-9-CM: 305.90  7/26/2018 Unknown Smoker ICD-10-CM: Q60.126 ICD-9-CM: 305.1  7/26/2018 Unknown Anemia ICD-10-CM: D64.9 ICD-9-CM: 285.9  7/25/2018 Unknown Rectal bleed ICD-10-CM: K62.5 ICD-9-CM: 569.3  7/25/2018 Unknown Transient hypotension ICD-10-CM: I95.9 ICD-9-CM: 796.3  7/25/2018 Unknown Adjustment disorder with mixed anxiety and depressed mood ICD-10-CM: F43.23 
ICD-9-CM: 309.28  4/20/2013 Yes Rectal bleeding No bm since admission, will continue h/h Q6h Gi was called, will have gi scan vs colonoscopy 
c diff pending  
cta :Colonic wall thickening of the transverse colon which may represent 
underdistention versus colitis either infectious or inflammatory  
Multiple low density lesions in the liver suggesting probable cysts or Hemangiomas. Will have gi bleeding scan stat if rebleeding , plan colonoscopy on Friday Anemia due to acute blood loss Received two units prbc, will continue h/h, will transfuse if rebleed or h/h significant drop Substance abuse Will put ativan if case withdrawal. uds , thc/cocaine Smoker Nicotine patch Depression No hi/si Hypotension Resolved. Continue iv infusion Central line placed per intensive Disposition :tbd Subjective: not feeling good, no bm 
Nurse: no bloody stool since admission, h/h low Review of systems: 
 
General: No fevers or chills. tired, pain all over Cardiovascular: No chest pain or pressure. No palpitations.   
Pulmonary: No shortness of breath. Gastrointestinal: No nausea, vomiting. Vital signs/Intake and Output: 
Visit Vitals  BP 94/57  Pulse 78  Temp 98.9 °F (37.2 °C)  Resp 14  
 Ht 5' 9\" (1.753 m)  Wt 49.9 kg (110 lb)  SpO2 100%  BMI 16.24 kg/m2 Current Shift:    
Last three shifts:  07/24 1901 - 07/26 0700 In: 615.4 Out: 550 [Urine:550] Physical Exam: 
General: WD, WN. Alert, cooperative, no acute distress   
HEENT: NC, Atraumatic. PERRLA, anicteric sclerae. Lungs: CTA Bilaterally. No Wheezing/Rhonchi/Rales. Heart:  Regular  rhythm,  No murmur, No Rubs, No Gallops Abdomen: Soft, Non distended, Non tender.  +Bowel sounds, Extremities: No c/c/e Psych:   Not anxious or agitated. Neurologic:  No acute neurological deficit. Labs: Results:  
   
Chemistry Recent Labs  
   07/26/18 
 0601  07/25/18 
 1315 GLU  77  130* NA  140  138  
K  3.6  3.5 CL  110*  104 CO2  25  28 BUN  5*  11  
CREA  0.69  0.90  
CA  7.3*  8.6 AGAP  5  6 BUCR  7*  12  
AP  32*  51  
TP  4.0*  6.1* ALB  2.0*  3.1*  
GLOB  2.0  3.0 AGRAT  1.0  1.0  
  
CBC w/Diff Recent Labs  
   07/26/18 
 0601  07/25/18 
 2130  07/25/18 
 1625  07/25/18 
 1315 WBC  6.1   --    --   7.6 RBC  3.00*   --    --   3.91* HGB  7.9*  6.0*  9.0*  10.0* HCT  24.5*  19.0*  28.9*  31.6*  
PLT  193   --    --   325 GRANS   --    --    --   64  
LYMPH   --    --    --   25  
EOS   --    --    --   1 Cardiac Enzymes Recent Labs  
   07/25/18 
 1315 CPK  55 CKND1  2.0 Coagulation Recent Labs  
   07/25/18 
 1315 PTP  13.3 INR  1.1 APTT  33.9 Lipid Panel Lab Results Component Value Date/Time  Cholesterol, total 122 04/21/2013 06:00 AM  
 HDL Cholesterol 46 04/21/2013 06:00 AM  
 LDL, calculated 58.6 04/21/2013 06:00 AM  
 VLDL, calculated 17.4 04/21/2013 06:00 AM  
 Triglyceride 87 04/21/2013 06:00 AM  
 CHOL/HDL Ratio 2.7 04/21/2013 06:00 AM  
  
BNP No results for input(s): BNPP in the last 72 hours.  
Liver Enzymes Recent Labs  
   07/26/18 
 0601 TP  4.0* ALB  2.0*  
AP  32* SGOT  9* Thyroid Studies Lab Results Component Value Date/Time TSH 1.12 02/20/2015 07:03 AM  
    
Procedures/imaging: see electronic medical records for all procedures/Xrays and details which were not copied into this note but were reviewed prior to creation of Plan Halley Reyes MD

## 2018-07-26 NOTE — PROGRESS NOTES
Initial assessment completed. AAOX4. .  On RA w/out sob. Monitor shows nsr w/out ectopy. Afebrile. Voided w/ 800 cc clear pale yellow urine. C/o of body mallaise  Due to what happened before admission. Dr Puma Waite visited w/ new order. Aknowledged order that bleeding scan was d/c.    1000- VS stable. Monitor no changed. 1200- Assessment no changed except except agitated as qouted \" Wyatt mad & I dont know why\" c/o of headache  & prn tylenol & ativan given. 1230- OOB to commoded & voided. VS stable. Monitor no changed. 1300- Back to bed. Brother visited. Lab results better & to repeat H & H @ 1800.    1400- voided in good amount w/ small soft brown bm. Pm care done & make comfortable as possible. 1600- Assessment no changed. VS stable. Monitor no changed . Afebrile. 1800- Condition no changed during the shift. No further bm noted. VS stable. 1830- H&H specimen sent to lab. 1930- Bedside and Verbal shift change report given to [address] [place] [address] (oncoming nurse) by Nicki Cedillo RN (offgoing nurse). Report included the following information SBAR, Kardex, ED Summary, Intake/Output, MAR, Accordion and Recent Results.

## 2018-07-26 NOTE — PROGRESS NOTES
Reason for Admission:  Rectal bleed                    RRAT Score:5                     Plan for utilizing home health:   TBD                       Likelihood of Readmission:  TBD                         Transition of Care Plan:  Cm attended IDR's attempted to speak with pt at bedside pt drowsy stated she had pain medication which is making her sleepy,cm will revisit pt at another time for d/c planning.

## 2018-07-26 NOTE — PROGRESS NOTES
[facility]  Pulmonary, Critical Care, and Sleep Medicine    Name: Cozette Castleman MRN: [id number]   : 1975 Hospital: [facility]   Date: 2018        Pulmonary Critical Care F/Up note                                              IMPRESSION:   · Lower GI bleeding  · Anemia, 2' to blood loss  · Transverse colonic wall thickening - underdistention versus colitis of infectious or inflammatory etiology  · Ingested high density material of unclear significance in the ascending colon and cecum  · Hx of seizure, took herself off of medications  · Substance abuse  · Nodular density projecting over the right 5th anterior rib possibly prominent costochondral cartilage  · Hx of right sided pneumothorax  · Nicotine dependence   RECOMMENDATIONS:   Compensated respiratory status. On room air. May use supplemental O2 via NC for goal SPO2> 90%  Aspiration prevention bundle, head of the bed at 30' all times, pulmonary hygiene care  Follow up oblique chest imaging when out of ICU given active smoking  Appreciate Gi consultation. C diff pending  Per GI may hold off on bleeding scan and order if bleeding starts again  Monitor H/H every 6 hrs, transfuse if Hgb < 7  IV Fluid: NS at 100 cc/hr  Monitor hemodynamics, UO  Stress ulcer prophylaxis - PPI  DVT prophylaxis - SCDs, avoid pharmacological prophylaxis   AM labs. Diet - as tolerated  Smoking cessation counseled for 5 minutes. Patient agrees to set a quit date and work on weaning herself  Will defer respective systems problem management to primary and other consultant and follow patient in ICU with primary and other medical team  Further recommendations will be based on the patient's response to recommended treatment and results of the investigation ordered. Quality Care: PPI, DVT prophylaxis, HOB elevated, Infection control all reviewed and addressed.   PAIN AND SEDATION: none  · Skin/Wound: none  · Nutrition: as tolerated  · Prophylaxis: DVT and GI Prophylaxis reviewed. · Restraints: none  · PT/OT eval and treat: as needed   · Lines/Tubes: lines R IJ 7/25/18, durham none, Ventilator none  ADVANCE DIRECTIVE: Full code  FAMILY DISCUSSION: Spoke with patient  Events and notes from last 24 hours reviewed. Care plan discussed with nursing      Subjective/History:   Patient is a 37 y.o. female with PMhx for seizure d/o, substance abuse, non-adherence to medication presented to ED with finding of BRBPR. In ED, she had multiple more BM with BRBPR. Some abdominal cramping when she feels BM coming up and relieved after BM. Labs showed some lowering of Hgb compared to her labs in 2017 at [facility]. No prior similar episode, no trauma or sexual abuse, use of foreign body in rectum, hx of PUD or cancer, use of NSAIDs or blood thinner, alcohol abuse, liver disease, fever, chills, abdominal pain, anorexia, nausea or vomiting, dysphagia, recent change in bowel habits or black or bloody stools or weight loss or antibiotics use. She reports last night she spent with a male friend at his house and drank some coffee. She does not recall details of last night events. Further work up is pending. 7/26/18  No further bleeding episode overnight  Received 2 units of PRBC and continued IVF with improved hemodynamic stability  The patient denies abdominal or flank pain, anorexia, nausea or vomiting, dysphagia. The patient denies cough, chest pain, dyspnea, wheezing or hemoptysis. No fever, chills.  Good urine output    Review of Systems:  General ROS: negative for  - fever, chills, weight loss, fatigue and malaise  Cardiovascular ROS: negative for - chest pain, edema, murmur, orthopnea, palpitations or paroxysmal nocturnal dyspnea  Dermatological ROS: negative for - pruritus, rash or skin lesion changes             Past Medical History:  Past Medical History:   Diagnosis Date    History of appendectomy     [facility]oid hemorrhage) ([facility] [address]     Substance abuse         Past Surgical History:  Past Surgical History:   Procedure Laterality Date    HX APPENDECTOMY      HX  SECTION      HX TRACHEOSTOMY          Medications:  Prior to Admission medications    Not on File       Current Facility-Administered Medications   Medication Dose Route Frequency    nicotine (NICODERM CQ) 14 mg/24 hr patch 1 Patch  1 Patch TransDERmal DAILY    pantoprazole (PROTONIX) 40 mg in sodium chloride 0.9% 10 mL injection  40 mg IntraVENous DAILY    0.9% sodium chloride infusion  125 mL/hr IntraVENous CONTINUOUS       Allergy:  No Known Allergies     Social History:  Social History   Substance Use Topics    Smoking status: Current Every Day Smoker     Packs/day: 1.00    Smokeless tobacco: Never Used    Alcohol use Yes        Family History:  History reviewed. Family history of mother with cancer of unknown type. Objective:   Vital Signs:    Blood pressure 105/62, pulse 85, temperature 98.9 °F (37.2 °C), resp. rate 17, height 5' 9\" (1.753 m), weight 49.9 kg (110 lb), last menstrual period 2018, SpO2 97 %. Body mass index is 16.24 kg/(m^2).    O2 Device: Room air       Temp (24hrs), Av.1 °F (36.7 °C), Min:97.3 °F (36.3 °C), Max:98.9 °F (37.2 °C)         Intake/Output:   Last shift:      701 -  190  In: 250 [P.O.:250]  Out: 800 [Urine:800]  Last 3 shifts: 1901 -  07  In: 615.4   Out: 550 [Urine:550]    Intake/Output Summary (Last 24 hours) at 18 0936  Last data filed at 18 0800   Gross per 24 hour   Intake            865.4 ml   Output             1350 ml   Net           -484.6 ml         Physical Exam:  General: AAO x 3, no respiratory distress, alert, cooperative, no distress, appears stated age  HEENT: PERRLA, EOMI, fundi benign, throat normal without erythema or exudate  Neck: No abnormally enlarged lymph nodes or thyroid, supple  Chest: normal  Lungs: moderate air entry, clear to auscultation bilaterally, normal percussion bilaterally, no tenderness/ rash  Heart: Regular rate and rhythm, S1S2 present or without murmur or extra heart sounds  Abdomen: non distended, bowel sounds normoactive, tympanic, abdomen is soft, improved sensitivity in both lower quadrants, no tenderness, masses, organomegaly or guarding, rigidity, rebound  Extremity: negative for edema, cyanosis, clubbing  Neuro: alert, oriented x 3, no defects noted in general exam.  Skin: Skin color, texture, turgor fair. Skin dry, non-diaphoretic    Data:     Recent Results (from the past 24 hour(s))   CBC WITH AUTOMATED DIFF    Collection Time: 07/25/18  1:15 PM   Result Value Ref Range    WBC 7.6 4.6 - 13.2 K/uL    RBC 3.91 (L) 4.20 - 5.30 M/uL    HGB 10.0 (L) 12.0 - 16.0 g/dL    HCT 31.6 (L) 35.0 - 45.0 %    MCV 80.8 74.0 - 97.0 FL    MCH 25.6 24.0 - 34.0 PG    MCHC 31.6 31.0 - 37.0 g/dL    RDW 15.9 (H) 11.6 - 14.5 %    PLATELET 073 029 - 377 K/uL    MPV 9.1 (L) 9.2 - 11.8 FL    NEUTROPHILS 64 40 - 73 %    LYMPHOCYTES 25 21 - 52 %    MONOCYTES 10 3 - 10 %    EOSINOPHILS 1 0 - 5 %    BASOPHILS 0 0 - 2 %    ABS. NEUTROPHILS 4.9 1.8 - 8.0 K/UL    ABS. LYMPHOCYTES 1.9 0.9 - 3.6 K/UL    ABS. MONOCYTES 0.8 0.05 - 1.2 K/UL    ABS. EOSINOPHILS 0.1 0.0 - 0.4 K/UL    ABS. BASOPHILS 0.0 0.0 - 0.1 K/UL    DF AUTOMATED     METABOLIC PANEL, COMPREHENSIVE    Collection Time: 07/25/18  1:15 PM   Result Value Ref Range    Sodium 138 136 - 145 mmol/L    Potassium 3.5 3.5 - 5.5 mmol/L    Chloride 104 100 - 108 mmol/L    CO2 28 21 - 32 mmol/L    Anion gap 6 3.0 - 18 mmol/L    Glucose 130 (H) 74 - 99 mg/dL    BUN 11 7.0 - 18 MG/DL    Creatinine 0.90 0.6 - 1.3 MG/DL    BUN/Creatinine ratio 12 12 - 20      GFR est AA >60 >60 ml/min/1.73m2    GFR est non-AA >60 >60 ml/min/1.73m2    Calcium 8.6 8.5 - 10.1 MG/DL    Bilirubin, total 0.5 0.2 - 1.0 MG/DL    ALT (SGPT) 14 13 - 56 U/L    AST (SGOT) 11 (L) 15 - 37 U/L    Alk.  phosphatase 51 45 - 117 U/L    Protein, total 6.1 (L) 6.4 - 8.2 g/dL    Albumin 3.1 (L) 3.4 - 5.0 g/dL    Globulin 3.0 2.0 - 4.0 g/dL    A-G Ratio 1.0 0.8 - 1.7     MAGNESIUM    Collection Time: 07/25/18  1:15 PM   Result Value Ref Range    Magnesium 1.8 1.6 - 2.6 mg/dL   CARDIAC PANEL,(CK, CKMB & TROPONIN)    Collection Time: 07/25/18  1:15 PM   Result Value Ref Range    CK 55 26 - 192 U/L    CK - MB 1.1 <3.6 ng/ml    CK-MB Index 2.0 0.0 - 4.0 %    Troponin-I, Qt. <0.02 0.00 - 0.06 NG/ML   PROTHROMBIN TIME + INR    Collection Time: 07/25/18  1:15 PM   Result Value Ref Range    Prothrombin time 13.3 11.5 - 15.2 sec    INR 1.1 0.8 - 1.2     PTT    Collection Time: 07/25/18  1:15 PM   Result Value Ref Range    aPTT 33.9 23.0 - 36.4 SEC   ETHYL ALCOHOL    Collection Time: 07/25/18  1:15 PM   Result Value Ref Range    ALCOHOL(ETHYL),SERUM <3 0 - 3 MG/DL   HCG QL SERUM    Collection Time: 07/25/18  1:15 PM   Result Value Ref Range    HCG, Ql. NEGATIVE  NEG     ACETAMINOPHEN    Collection Time: 07/25/18  1:15 PM   Result Value Ref Range    Acetaminophen level <2 (L) 10.0 - 26.6 ug/mL   SALICYLATE    Collection Time: 07/25/18  1:15 PM   Result Value Ref Range    Salicylate level 3.0 2.8 - 20 MG/DL   TYPE & SCREEN    Collection Time: 07/25/18  1:30 PM   Result Value Ref Range    Crossmatch Expiration 07/28/2018     ABO/Rh(D) O POSITIVE     Antibody screen NEG     CALLED TO: 2 PRBCs [facility]Y [facility] AT 1905 ON [id number] BY [facility]     Unit number [id number]     Blood component type [facility]     Unit division 00     Status of unit ISSUED     Crossmatch result Compatible     Unit number [id number]     Blood component type [facility]     Unit division 00     Status of unit ISSUED     Crossmatch result Compatible    URINALYSIS W/ RFLX MICROSCOPIC    Collection Time: 07/25/18  4:05 PM   Result Value Ref Range    Color DARK YELLOW      Appearance CLOUDY      Specific gravity >1.030 (H) 1.005 - 1.030    pH (UA) 7.0 5.0 - 8.0      Protein >1000 (A) NEG mg/dL    Glucose 100 (A) NEG mg/dL    Ketone NEGATIVE  NEG mg/dL    Bilirubin NEGATIVE  NEG      Blood LARGE (A) NEG      Urobilinogen 0.2 0.2 - 1.0 EU/dL    Nitrites NEGATIVE  NEG      Leukocyte Esterase TRACE (A) NEG     DRUG SCREEN, URINE    Collection Time: 07/25/18  4:05 PM   Result Value Ref Range    BENZODIAZEPINES NEGATIVE  NEG      BARBITURATES NEGATIVE  NEG      THC (TH-CANNABINOL) POSITIVE (A) NEG      OPIATES NEGATIVE  NEG      PCP(PHENCYCLIDINE) NEGATIVE  NEG      COCAINE POSITIVE (A) NEG      AMPHETAMINES NEGATIVE  NEG      METHADONE NEGATIVE  NEG      HDSCOM (NOTE)    URINE MICROSCOPIC ONLY    Collection Time: 07/25/18  4:05 PM   Result Value Ref Range    WBC 0 to 3 0 - 5 /hpf    RBC TOO NUMEROUS TO COUNT 0 - 5 /hpf    Epithelial cells FEW 0 - 5 /lpf    Bacteria FEW (A) NEG /hpf   HGB & HCT    Collection Time: 07/25/18  4:25 PM   Result Value Ref Range    HGB 9.0 (L) 12.0 - 16.0 g/dL    HCT 28.9 (L) 35.0 - 45.0 %   EKG, 12 LEAD, INITIAL    Collection Time: 07/25/18  7:18 PM   Result Value Ref Range    Ventricular Rate 81 BPM    Atrial Rate 81 BPM    P-R Interval 120 ms    QRS Duration 70 ms    Q-T Interval 404 ms    QTC Calculation (Bezet) 469 ms    Calculated P Axis 72 degrees    Calculated R Axis 59 degrees    Calculated T Axis 29 degrees    Diagnosis       Normal sinus rhythm  Normal ECG  When compared with ECG of 09-SEP-2014 10:14,  Nonspecific T wave abnormality now evident in Inferior leads  T wave amplitude has decreased in Anterolateral leads     HGB & HCT    Collection Time: 07/25/18  9:30 PM   Result Value Ref Range    HGB 6.0 (L) 12.0 - 16.0 g/dL    HCT 19.0 (L) 35.0 - 45.0 %   CBC W/O DIFF    Collection Time: 07/26/18  6:01 AM   Result Value Ref Range    WBC 6.1 4.6 - 13.2 K/uL    RBC 3.00 (L) 4.20 - 5.30 M/uL    HGB 7.9 (L) 12.0 - 16.0 g/dL    HCT 24.5 (L) 35.0 - 45.0 %    MCV 81.7 74.0 - 97.0 FL    MCH 26.3 24.0 - 34.0 PG    MCHC 32.2 31.0 - 37.0 g/dL    RDW 15.6 (H) 11.6 - 14.5 %    PLATELET 559 587 - 042 K/uL    MPV 8.7 (L) 9.2 - 09.1 FL   METABOLIC PANEL, COMPREHENSIVE    Collection Time: 07/26/18  6:01 AM   Result Value Ref Range    Sodium 140 136 - 145 mmol/L    Potassium 3.6 3.5 - 5.5 mmol/L    Chloride 110 (H) 100 - 108 mmol/L    CO2 25 21 - 32 mmol/L    Anion gap 5 3.0 - 18 mmol/L    Glucose 77 74 - 99 mg/dL    BUN 5 (L) 7.0 - 18 MG/DL    Creatinine 0.69 0.6 - 1.3 MG/DL    BUN/Creatinine ratio 7 (L) 12 - 20      GFR est AA >60 >60 ml/min/1.73m2    GFR est non-AA >60 >60 ml/min/1.73m2    Calcium 7.3 (L) 8.5 - 10.1 MG/DL    Bilirubin, total 0.4 0.2 - 1.0 MG/DL    ALT (SGPT) 9 (L) 13 - 56 U/L    AST (SGOT) 9 (L) 15 - 37 U/L    Alk. phosphatase 32 (L) 45 - 117 U/L    Protein, total 4.0 (L) 6.4 - 8.2 g/dL    Albumin 2.0 (L) 3.4 - 5.0 g/dL    Globulin 2.0 2.0 - 4.0 g/dL    A-G Ratio 1.0 0.8 - 1.7     CALCIUM, IONIZED    Collection Time: 07/26/18  6:01 AM   Result Value Ref Range    Ionized Calcium 1.13 1.12 - 1.32 MMOL/L   MAGNESIUM    Collection Time: 07/26/18  6:01 AM   Result Value Ref Range    Magnesium 1.7 1.6 - 2.6 mg/dL   PHOSPHORUS    Collection Time: 07/26/18  6:01 AM   Result Value Ref Range    Phosphorus 2.7 2.5 - 4.9 MG/DL           No results for input(s): FIO2I, IFO2, HCO3I, IHCO3, HCOPOC, PCO2I, PCOPOC, IPHI, PHI, PHPOC, PO2I, PO2POC in the last 72 hours. No lab exists for component: IPOC2    All Micro Results     Procedure Component Value Units Date/Time    C. DIFFICILE/EPI PCR [[id number]]     Order Status:  Sent Specimen:  Stool           Telemetry: normal sinus rhythm    Imaging:  [x]I have personally reviewed the patients chest radiographs images and report       Results from Hospital Encounter encounter on 07/25/18   XR CHEST PORT   Narrative EXAM: AP portable upright chest    INDICATION: Central line placement    COMPARISON: September 9, 2014    _______________    FINDINGS:    There is a right internal jugular central line with tip overlying the superior  vena cava.  Heart and mediastinal contours are normal. There are no focal  consolidations or effusions. There is a lucency projecting over the right upper  lobe with lung markings beyond it likely a skinfold. No definite pneumothorax  seen. There is a 18 x 10 mm nodular density projecting over the right fifth  anterior rib which may represent prominent costochondral junction. Oblique view  of the right chest recommended to exclude a pulmonary nodule.    _______________         Impression IMPRESSION:    Right central line in place. No infiltrates or effusions or definite  pneumothorax. Suggestion of a skinfold on the right. Nodular density projecting over the right fifth anterior rib possibly  representing a prominent costochondral cartilage. Oblique view of the right  hemithorax recommended to exclude any pulmonary nodules. Results from Hospital Encounter encounter on 07/25/18   CTA ABDOMEN PELV W CONT   Narrative EXAM: CTA abdomen and pelvis    INDICATION: Rectal bleeding suspecting internal bleeding anemia    COMPARISON: None. TECHNIQUE: Axial CT imaging from the dome of the diaphragms to the pubic  symphysis with and without intravenous contrast. Coronal and sagittal MIP  reformats were generated. DOSE REDUCTION:  One or more dose reduction techniques were used on this CT:  automated exposure control, adjustment of the mAs and/or kVp according to  patient's size, and iterative reconstruction techniques. The specific techniques  utilized on this CT exam have been documented in the patient's electronic  medical record.    _______________    FINDINGS:    Lung bases: Unremarkable    Aorta and major vessels: Unenhanced images demonstrate no mural thrombus in the  aorta. Abdominal aorta is normal in caliber. Postcontrast images demonstrate no  aortic dissection. Celiac artery, superior mesenteric artery, renal arteries,  inferior mesenteric artery, iliac arteries and proximal femoral arteries are  patent. ABDOMEN and pelvis:     Liver: There is moderate hepatic steatosis.  Multiple rounded low attenuations  are seen in the liver. Image 24 right hepatic lobe demonstrates a 1.5 cm rounded  low-attenuation with a peripheral calcification. Image 33 demonstrates a 11 mm  rounded low-attenuation left hepatic lobe. Image 36 demonstrates a 11 mm  low-attenuation left hepatic lobe. There is a peripheral low-attenuation the  right hepatic lobe measuring 24 x 17 mm and this is suggestive a hemangioma with  peripheral nodular pooling. Gallbladder is unremarkable. There is no bile duct dilatation. Pancreas:  Unremarkable    Pancreas: Unremarkable    Spleen: Unremarkable    Adrenals: Unremarkable    Kidneys: Unremarkable    Lymph nodes: No enlarged lymph nodes seen. Bowel: Mild colonic diverticulosis present. Moderate amount of stool present  throughout the colon. There is a 9 mm high density focus in the lumen of the  ascending colon on image 77 and another smaller high density focus seen in the  cecum measuring 6 cm. These are suggestive of ingested high density material  possibly bone or foreign body. There is colonic wall thickening of the proximal  transverse colon and ascending colon which may reflect under distention versus  colitis. Definite active intraluminal hemorrhage not identified. No bowel  obstruction seen. Pelvic organs: Uterus is unremarkable. The urinary bladder is under distended  therefore difficult to evaluate. No free fluid seen. There is incidental note of a small periumbilical hernia with fat content. OTHER: No acute or aggressive osseous abnormalities identified. _______________         Impression IMPRESSION:    No evidence of an abdominal aortic aneurysm. Abdominal vessels are patent. No  definite active extravasation seen. If there still clinical concern for  intraluminal hemorrhage and I would recommend a nuclear medicine GI bleeding  scan for further evaluation since that would be more sensitive.     Colonic wall thickening of the transverse colon which may represent  underdistention versus colitis either infectious or inflammatory    Multiple low density lesions in the liver suggesting probable cysts or  hemangiomas. Ingested high density material in the ascending colon and cecum         [x]See my orders for details    My assessment, plan of care, findings, medications, side effects etc were discussed with:  [x]nursing []PT/OT    []respiratory therapy []Dr. Maldonado Filter [x]Patient     [x]Total critical care time exclusive of procedures 36 minutes with complex decision making performed and > 50% time spent in face to face evaluation.     Cecilia Kwong MD

## 2018-07-26 NOTE — ROUTINE PROCESS
TRANSFER - OUT REPORT:    Verbal report given to Valentino Glade RN(name) on Alma Juárez  being transferred to ICU(unit) for routine progression of care       Report consisted of patients Situation, Background, Assessment and   Recommendations(SBAR). Information from the following report(s) SBAR, ED Summary and MAR was reviewed with the receiving nurse. Lines:   Peripheral IV 07/25/18 Right Hand (Active)   Site Assessment Clean, dry, & intact 7/25/2018  1:30 PM   Phlebitis Assessment 0 7/25/2018  1:30 PM   Infiltration Assessment 0 7/25/2018  1:30 PM   Dressing Status Clean, dry, & intact 7/25/2018  1:30 PM   Dressing Type Transparent 7/25/2018  1:30 PM   Hub Color/Line Status Green 7/25/2018  1:30 PM        Opportunity for questions and clarification was provided.       Patient transported with:   Monitor  Registered Nurse
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
## 2018-07-27 ENCOUNTER — APPOINTMENT (OUTPATIENT)
Dept: GENERAL RADIOLOGY | Age: 43
DRG: 254 | End: 2018-07-27
Attending: INTERNAL MEDICINE
Payer: MEDICAID

## 2018-07-27 PROBLEM — E83.42 HYPOMAGNESEMIA: Status: ACTIVE | Noted: 2018-07-27

## 2018-07-27 PROBLEM — E83.39 HYPOPHOSPHATEMIA: Status: ACTIVE | Noted: 2018-07-27

## 2018-07-27 LAB
ABO + RH BLD: NORMAL
BASOPHILS # BLD: 0 K/UL (ref 0–0.1)
BASOPHILS NFR BLD: 0 % (ref 0–2)
BLD PROD TYP BPU: NORMAL
BLD PROD TYP BPU: NORMAL
BLOOD GROUP ANTIBODIES SERPL: NORMAL
BPU ID: NORMAL
BPU ID: NORMAL
CA-I SERPL-SCNC: 1.07 MMOL/L (ref 1.12–1.32)
CA-I SERPL-SCNC: 1.08 MMOL/L (ref 1.12–1.32)
CALLED TO:,BCALL1: NORMAL
CROSSMATCH RESULT,%XM: NORMAL
CROSSMATCH RESULT,%XM: NORMAL
DIFFERENTIAL METHOD BLD: ABNORMAL
EOSINOPHIL # BLD: 0 K/UL (ref 0–0.4)
EOSINOPHIL NFR BLD: 0 % (ref 0–5)
ERYTHROCYTE [DISTWIDTH] IN BLOOD BY AUTOMATED COUNT: 15.9 % (ref 11.6–14.5)
HCT VFR BLD AUTO: 23 % (ref 35–45)
HCT VFR BLD AUTO: 24.7 % (ref 35–45)
HGB BLD-MCNC: 7.5 G/DL (ref 12–16)
HGB BLD-MCNC: 8 G/DL (ref 12–16)
LYMPHOCYTES # BLD: 0.8 K/UL (ref 0.9–3.6)
LYMPHOCYTES NFR BLD: 20 % (ref 21–52)
MAGNESIUM SERPL-MCNC: 1.5 MG/DL (ref 1.6–2.6)
MAGNESIUM SERPL-MCNC: 1.9 MG/DL (ref 1.6–2.6)
MCH RBC QN AUTO: 26.6 PG (ref 24–34)
MCHC RBC AUTO-ENTMCNC: 32.4 G/DL (ref 31–37)
MCV RBC AUTO: 82.1 FL (ref 74–97)
MONOCYTES # BLD: 0.4 K/UL (ref 0.05–1.2)
MONOCYTES NFR BLD: 9 % (ref 3–10)
NEUTS SEG # BLD: 2.6 K/UL (ref 1.8–8)
NEUTS SEG NFR BLD: 71 % (ref 40–73)
PHOSPHATE SERPL-MCNC: 1.7 MG/DL (ref 2.5–4.9)
PLATELET # BLD AUTO: 173 K/UL (ref 135–420)
PMV BLD AUTO: 8.5 FL (ref 9.2–11.8)
POTASSIUM SERPL-SCNC: 3.7 MMOL/L (ref 3.5–5.5)
POTASSIUM SERPL-SCNC: 3.9 MMOL/L (ref 3.5–5.5)
RBC # BLD AUTO: 3.01 M/UL (ref 4.2–5.3)
SPECIMEN EXP DATE BLD: NORMAL
STATUS OF UNIT,%ST: NORMAL
STATUS OF UNIT,%ST: NORMAL
UNIT DIVISION, %UDIV: 0
UNIT DIVISION, %UDIV: 0
WBC # BLD AUTO: 3.8 K/UL (ref 4.6–13.2)

## 2018-07-27 PROCEDURE — 36415 COLL VENOUS BLD VENIPUNCTURE: CPT | Performed by: INTERNAL MEDICINE

## 2018-07-27 PROCEDURE — 74011250637 HC RX REV CODE- 250/637: Performed by: INTERNAL MEDICINE

## 2018-07-27 PROCEDURE — 74011000250 HC RX REV CODE- 250: Performed by: INTERNAL MEDICINE

## 2018-07-27 PROCEDURE — 74011250636 HC RX REV CODE- 250/636: Performed by: INTERNAL MEDICINE

## 2018-07-27 PROCEDURE — 77030038020 HC MANFLD NEPTUNE STRY -B: Performed by: INTERNAL MEDICINE

## 2018-07-27 PROCEDURE — 87040 BLOOD CULTURE FOR BACTERIA: CPT | Performed by: INTERNAL MEDICINE

## 2018-07-27 PROCEDURE — 82330 ASSAY OF CALCIUM: CPT | Performed by: INTERNAL MEDICINE

## 2018-07-27 PROCEDURE — 71045 X-RAY EXAM CHEST 1 VIEW: CPT

## 2018-07-27 PROCEDURE — 85018 HEMOGLOBIN: CPT | Performed by: INTERNAL MEDICINE

## 2018-07-27 PROCEDURE — 84132 ASSAY OF SERUM POTASSIUM: CPT | Performed by: INTERNAL MEDICINE

## 2018-07-27 PROCEDURE — 74011000258 HC RX REV CODE- 258: Performed by: INTERNAL MEDICINE

## 2018-07-27 PROCEDURE — 84100 ASSAY OF PHOSPHORUS: CPT | Performed by: INTERNAL MEDICINE

## 2018-07-27 PROCEDURE — 85025 COMPLETE CBC W/AUTO DIFF WBC: CPT | Performed by: INTERNAL MEDICINE

## 2018-07-27 PROCEDURE — 83735 ASSAY OF MAGNESIUM: CPT | Performed by: INTERNAL MEDICINE

## 2018-07-27 PROCEDURE — 74011250637 HC RX REV CODE- 250/637: Performed by: HOSPITALIST

## 2018-07-27 PROCEDURE — G0500 MOD SEDAT ENDO SERVICE >5YRS: HCPCS | Performed by: INTERNAL MEDICINE

## 2018-07-27 PROCEDURE — 0DJD8ZZ INSPECTION OF LOWER INTESTINAL TRACT, VIA NATURAL OR ARTIFICIAL OPENING ENDOSCOPIC: ICD-10-PCS | Performed by: INTERNAL MEDICINE

## 2018-07-27 PROCEDURE — 87077 CULTURE AEROBIC IDENTIFY: CPT | Performed by: INTERNAL MEDICINE

## 2018-07-27 PROCEDURE — 65610000006 HC RM INTENSIVE CARE

## 2018-07-27 PROCEDURE — 76040000008: Performed by: INTERNAL MEDICINE

## 2018-07-27 PROCEDURE — 77030020256 HC SOL INJ NACL 0.9%  500ML: Performed by: INTERNAL MEDICINE

## 2018-07-27 PROCEDURE — 87186 SC STD MICRODIL/AGAR DIL: CPT | Performed by: INTERNAL MEDICINE

## 2018-07-27 PROCEDURE — C9113 INJ PANTOPRAZOLE SODIUM, VIA: HCPCS | Performed by: INTERNAL MEDICINE

## 2018-07-27 PROCEDURE — 74011250636 HC RX REV CODE- 250/636

## 2018-07-27 RX ORDER — DIPHENHYDRAMINE HYDROCHLORIDE 50 MG/ML
INJECTION, SOLUTION INTRAMUSCULAR; INTRAVENOUS AS NEEDED
Status: DISCONTINUED | OUTPATIENT
Start: 2018-07-27 | End: 2018-07-28

## 2018-07-27 RX ORDER — MIDAZOLAM HYDROCHLORIDE 1 MG/ML
.5-5 INJECTION, SOLUTION INTRAMUSCULAR; INTRAVENOUS
Status: DISCONTINUED | OUTPATIENT
Start: 2018-07-27 | End: 2018-07-27 | Stop reason: HOSPADM

## 2018-07-27 RX ORDER — NALOXONE HYDROCHLORIDE 0.4 MG/ML
0.4 INJECTION, SOLUTION INTRAMUSCULAR; INTRAVENOUS; SUBCUTANEOUS
Status: DISCONTINUED | OUTPATIENT
Start: 2018-07-27 | End: 2018-07-27 | Stop reason: HOSPADM

## 2018-07-27 RX ORDER — DOCUSATE SODIUM 100 MG/1
100 CAPSULE, LIQUID FILLED ORAL 2 TIMES DAILY
Status: DISCONTINUED | OUTPATIENT
Start: 2018-07-27 | End: 2018-07-29

## 2018-07-27 RX ORDER — FLUMAZENIL 0.1 MG/ML
0.2 INJECTION INTRAVENOUS
Status: DISCONTINUED | OUTPATIENT
Start: 2018-07-27 | End: 2018-07-27 | Stop reason: HOSPADM

## 2018-07-27 RX ORDER — LEVOFLOXACIN 5 MG/ML
500 INJECTION, SOLUTION INTRAVENOUS EVERY 24 HOURS
Status: DISCONTINUED | OUTPATIENT
Start: 2018-07-27 | End: 2018-07-27

## 2018-07-27 RX ORDER — SODIUM CHLORIDE 9 MG/ML
100 INJECTION, SOLUTION INTRAVENOUS CONTINUOUS
Status: DISCONTINUED | OUTPATIENT
Start: 2018-07-27 | End: 2018-07-28

## 2018-07-27 RX ORDER — LEVOFLOXACIN 5 MG/ML
500 INJECTION, SOLUTION INTRAVENOUS EVERY 24 HOURS
Status: COMPLETED | OUTPATIENT
Start: 2018-07-28 | End: 2018-07-31

## 2018-07-27 RX ORDER — MAGNESIUM SULFATE HEPTAHYDRATE 40 MG/ML
2 INJECTION, SOLUTION INTRAVENOUS ONCE
Status: COMPLETED | OUTPATIENT
Start: 2018-07-27 | End: 2018-07-28

## 2018-07-27 RX ORDER — FENTANYL CITRATE 50 UG/ML
INJECTION, SOLUTION INTRAMUSCULAR; INTRAVENOUS AS NEEDED
Status: DISCONTINUED | OUTPATIENT
Start: 2018-07-27 | End: 2018-07-28

## 2018-07-27 RX ORDER — FENTANYL CITRATE 50 UG/ML
100 INJECTION, SOLUTION INTRAMUSCULAR; INTRAVENOUS
Status: DISCONTINUED | OUTPATIENT
Start: 2018-07-27 | End: 2018-07-27 | Stop reason: HOSPADM

## 2018-07-27 RX ORDER — MIDAZOLAM HYDROCHLORIDE 1 MG/ML
INJECTION, SOLUTION INTRAMUSCULAR; INTRAVENOUS AS NEEDED
Status: DISCONTINUED | OUTPATIENT
Start: 2018-07-27 | End: 2018-07-28

## 2018-07-27 RX ORDER — POTASSIUM CHLORIDE 20 MEQ/1
20 TABLET, EXTENDED RELEASE ORAL ONCE
Status: COMPLETED | OUTPATIENT
Start: 2018-07-27 | End: 2018-07-27

## 2018-07-27 RX ADMIN — POTASSIUM CHLORIDE 20 MEQ: 20 TABLET, EXTENDED RELEASE ORAL at 09:01

## 2018-07-27 RX ADMIN — IRON SUCROSE 200 MG: 20 INJECTION, SOLUTION INTRAVENOUS at 19:05

## 2018-07-27 RX ADMIN — SODIUM CHLORIDE 40 MG: 9 INJECTION INTRAMUSCULAR; INTRAVENOUS; SUBCUTANEOUS at 09:01

## 2018-07-27 RX ADMIN — CALCIUM GLUCONATE 2 G: 94 INJECTION, SOLUTION INTRAVENOUS at 09:02

## 2018-07-27 RX ADMIN — DOCUSATE SODIUM 100 MG: 100 CAPSULE, LIQUID FILLED ORAL at 21:09

## 2018-07-27 RX ADMIN — SODIUM CHLORIDE 100 ML/HR: 900 INJECTION, SOLUTION INTRAVENOUS at 18:30

## 2018-07-27 RX ADMIN — SODIUM PHOSPHATE, MONOBASIC, MONOHYDRATE AND SODIUM PHOSPHATE, DIBASIC ANHYDROUS 9 MMOL: 276; 142 INJECTION, SOLUTION INTRAVENOUS at 09:02

## 2018-07-27 RX ADMIN — LEVOFLOXACIN 500 MG: 5 INJECTION, SOLUTION INTRAVENOUS at 11:48

## 2018-07-27 RX ADMIN — ACETAMINOPHEN 650 MG: 325 SOLUTION ORAL at 05:03

## 2018-07-27 RX ADMIN — MAGNESIUM SULFATE HEPTAHYDRATE 2 G: 40 INJECTION, SOLUTION INTRAVENOUS at 08:59

## 2018-07-27 NOTE — PROCEDURES
[facility]  Colonoscopy Procedure Report  _______________________________________________________  Patient: Idalia Troncoso                                         Attending Physician: Lanette Huston MD    Patient ID: [id number]                                      Referring Physician: None    Exam Date: July 27, 2018 _______________________________________________________      Introduction: A  37 y.o. female patient, presents for inpatient Colonoscopy    Indications: severe rectal  Bleeding started the morning of July 25, 2018 hgb dropped to 7.5 and now 8 after 2 blood transfusions but she stopped bleeding after her admission. Consent: The benefits, risks, and alternatives to the procedure were discussed and informed consent was obtained from the patient. Preparation: EKG, pulse, pulse oximetry and blood pressure were monitored throughout the procedure. ASA Classification: Class 1 - . The heart is an S1-S2 and regular heart rate and rhythm. Lungs are clear to auscultation and percussion. Abdomen is soft, nondistended, and nontender. Mental Status: awake, alert, and oriented to person, place, and time    Medications:  · Fentanyl 100 mcg IV before procedure. · Versed 5 mg IV throughout the procedure. · Benadryl 50 mg IV    Rectal Exam: Slightly sensitive digital rectal exam but for sure not exquisite. No Blood abscess or external fissure. Pathology Specimens: No specimens removed. Procedure:  Patient in her bed in the ICU. The colonoscope was passed with ease through the anus under direct visualization and advanced to the cecum and 10 cm inside the terminal ileum. The scope was withdrawn and the mucosa was carefully examined. The quality of the preparation was excellent. The views were excellent. The patient's toleration of the procedure was excellent. Retroflexion was preformed in the ascending colon and hepatic flexure. The exam was done twice to the cecum.  Total time is 18 minutes and withdrawal time is 13 minutes. Findings:    Rectum: Moderately deep triangular ulceration in the distal rectum sitting on a hemorrhoid anterior wall maximum 12 mm but not deep or through and through it looks like an inverted V shaped ulceration without any stigmata. No adherent clot or visible vessel. Few completely benign flat hyperplastic polyps in the rectum not touched. Sigmoid:   normal   Descending Colon:   Normal  Transverse Colon:   Normal  Ascending Colon:   Normal  Cecum:   Normal  Terminal Ileum:   Normal      Unplanned Events: There were no unplanned events. Estimated Blood Loss: None  Impressions:  Slightly sensitive digital rectal exam but for sure not exquisite. There is a moderately deep triangular ulceration in the distal rectum sitting on a hemorrhoid anterior wall maximum 12 mm but not deep or through and through it looks like an inverted V shaped ulceration without any stigmata. No adherent clot or visible vessel. Few completely benign flat hyperplastic polyps in the rectum not touched. Slightly tortuous sigmoid colon. Normal Mucosa. No blood, diverticula or adenoma polyps found. Complications: None; patient tolerated the procedure well. Recommendations:  · Observe for another day before releasing  · Resume a high fiber diet. · Take stool softeners  Colace 100 mg twice daily or more if needed to keep the stools very soft and easy  · Do not use paper tissue rather baby wipes, soap and water to clean. No straining at the toilet  · Colonoscopy recommendation in 10 years. · If bleeding resumes profusely then she would benefit from hemostatic suturing.   · Can be give IV iron infusion while in the hospital    Procedure Codes:    · COLONOSCOPY [ADI2420 (Type: Custom)]    Endoscope Information:  Model Number(s)    [id number]     Assistant: None      Signed By: Damion Gould MD Date: July 27, 2018

## 2018-07-27 NOTE — PROGRESS NOTES
Hospitalist Progress Note-critical care note Patient: Italia Guerrero MRN: [id number]  CSN: [id number] YOB: 1975  Age: 37 y.o. Sex: female DOA: 7/25/2018 LOS:  LOS: 2 days Chief complaint: gi bleeding, smoker , substance abuse , transient hypotension , anemia Assessment/Plan Hospital Problems  Date Reviewed: 11/23/2013 Codes Class Noted POA Substance abuse ICD-10-CM: F19.10 ICD-9-CM: 305.90  7/26/2018 Unknown Smoker ICD-10-CM: I62.017 ICD-9-CM: 305.1  7/26/2018 Unknown Anemia ICD-10-CM: D64.9 ICD-9-CM: 285.9  7/25/2018 Unknown * (Principal)Rectal bleed ICD-10-CM: K62.5 ICD-9-CM: 569.3  7/25/2018 Unknown Transient hypotension ICD-10-CM: I95.9 ICD-9-CM: 796.3  7/25/2018 Unknown Adjustment disorder with mixed anxiety and depressed mood ICD-10-CM: F43.23 
ICD-9-CM: 309.28  4/20/2013 Yes Rectal bleeding Report \"greenish diarrhea' will continue h/h Q6h, so far stable Gi on board and will have colonoscopy today  
c diff pending  
cta :Colonic wall thickening of the transverse colon which may represent 
underdistention versus colitis either infectious or inflammatory  
Multiple low density lesions in the liver suggesting probable cysts or Hemangiomas. Anemia due to acute blood loss Received two units prbc, will continue h/h, will transfuse if rebleed or h/h significant drop So far h/h stable Substance abuse Prn ativan if case withdrawal. uds , thc/cocaine Smoker Nicotine patch Depression No hi/si Hypotension Resolved. Continue iv infusion Central line placed per intensive Fever: on levaquin Ua/chest x-ray clear, might due to gi  Source Disposition :tbd Subjective: tired and want to go to sleep , diarrhea Review of systems: 
 
General: No fevers or chills. tired, 
Cardiovascular: No chest pain or pressure. No palpitations.   
Pulmonary: No shortness of breath. Gastrointestinal: No nausea, vomiting. Diarrhea Vital signs/Intake and Output: 
Visit Vitals  /84  Pulse 86  Temp 99.3 °F (37.4 °C)  Resp 19  
 Ht 5' 9\" (1.753 m)  Wt 49.9 kg (110 lb)  SpO2 99%  BMI 16.24 kg/m2 Current Shift:    
Last three shifts:  07/25 1901 - 07/27 0700 In: 3265.4 [P.O.:2650] Out: 1013 Zamudio Ken [[id number]] Physical Exam: 
General: WD, WN. Alert, cooperative, no acute distress   
HEENT: NC, Atraumatic. PERRLA, anicteric sclerae. Lungs: CTA Bilaterally. No Wheezing/Rhonchi/Rales. Heart:  Regular  rhythm,  No murmur, No Rubs, No Gallops Abdomen: Soft, Non distended, Non tender.  +Bowel sounds, Extremities: No c/c/e Psych:   Not anxious or agitated. Neurologic:  No acute neurological deficit. Labs: Results:  
   
Chemistry Recent Labs  
   07/27/18 
 0535  07/26/18 
 1150  07/26/18 
 0601  07/25/18 
 1315 GLU   --    --   77  130* NA   --    --   140  138  
K  3.7  4.2  3.6  3.5 CL   --    --   110*  104 CO2   --    --   25  28 BUN   --    --   5*  11  
CREA   --    --   0.69  0.90 CA   --    --   7.3*  8.6 AGAP   --    --   5  6 BUCR   --    --   7*  12  
AP   --    --   32*  51  
TP   --    --   4.0*  6.1* ALB   --    --   2.0*  3.1*  
GLOB   --    --   2.0  3.0 AGRAT   --    --   1.0  1.0  
  
CBC w/Diff Recent Labs  
   07/27/18 
 1143  07/27/18 
 0535  07/26/18 
 1830   07/26/18 
 0601   07/25/18 
 1315 WBC  3.8*   --    --    --   6.1   --   7.6 RBC  3.01*   --    --    --   3.00*   --   3.91* HGB  8.0*  7.5*  7.6*   < >  7.9*   < >  10.0* HCT  24.7*  23.0*  23.4*   < >  24.5*   < >  31.6*  
PLT  173   --    --    --   193   --   325 GRANS  71   --    --    --    --    --   64  
LYMPH  20*   --    --    --    --    --   25  
EOS  0   --    --    --    --    --   1  
 < > = values in this interval not displayed. Cardiac Enzymes Recent Labs  
   07/25/18 
 1315 CPK  55 CKND1  2.0 Coagulation Recent Labs  
   07/25/18 
 1315 PTP  13.3 INR  1.1 APTT  33.9 Lipid Panel Lab Results Component Value Date/Time Cholesterol, total 122 04/21/2013 06:00 AM  
 HDL Cholesterol 46 04/21/2013 06:00 AM  
 LDL, calculated 58.6 04/21/2013 06:00 AM  
 VLDL, calculated 17.4 04/21/2013 06:00 AM  
 Triglyceride 87 04/21/2013 06:00 AM  
 CHOL/HDL Ratio 2.7 04/21/2013 06:00 AM  
  
BNP No results for input(s): BNPP in the last 72 hours. Liver Enzymes Recent Labs  
   07/26/18 
 0601 TP  4.0* ALB  2.0*  
AP  32* SGOT  9* Thyroid Studies Lab Results Component Value Date/Time TSH 1.12 02/20/2015 07:03 AM  
    
Procedures/imaging: see electronic medical records for all procedures/Xrays and details which were not copied into this note but were reviewed prior to creation of Plan Karishma Cuevas MD

## 2018-07-27 NOTE — PROGRESS NOTES
Cm met with pt at bedside, refused to talk at this time states' I'm tired of people waking me up,bothering me can you leave so I can sleep\"cm briefly explained role and left resources at bedside.

## 2018-07-27 NOTE — PROGRESS NOTES
0730- Bedside and Verbal shift change report given to Oanh Ortiz RN (oncoming nurse) by Nicole Jane RN (offgoing nurse). Report included the following information SBAR, Kardex, Intake/Output, MAR, Recent Results and Cardiac Rhythm SR.  
 
0800- Initial shift assessment complete, see EMR.  
 
1230- Bedside and Verbal hand off  report given to Poornima Valenzuela RN (oncoming nurse) by Oanh Ortiz RN (offgoing nurse).  Report included the following information SBAR, Intake/Output, MAR, Recent Results and Cardiac Rhythm SR.

## 2018-07-27 NOTE — PROGRESS NOTES
[facility]ists Pulmonary, Critical Care, and Sleep Medicine Name: Ren Herrera MRN: [id number] : 1975 Hospital: [facility] [place] Date: 2018 Pulmonary Critical Care F/Up note IMPRESSION:  
· Lower GI bleeding · Anemia, 2' to blood loss · Transverse colonic wall thickening - underdistention versus colitis of infectious or inflammatory etiology · Ingested high density material of unclear significance in the ascending colon and cecum · Fever · Hx of seizure, took herself off of Dilantin · Substance abuse · Nodular density projecting over the right 5th anterior rib possibly prominent costochondral cartilage · Hx of right sided pneumothorax · Nicotine dependence RECOMMENDATIONS:  
Compensated respiratory status. On room air. Monitor for goal SPO2> 90% Aspiration prevention bundle, head of the bed at 30' all times, pulmonary hygiene care Follow up oblique chest imaging when out of ICU given active smoking No symptoms or signs of acute abdomen. Would start Levofloxacin for any UTI Check CBC today and in AM 
Cultures been collected and will be followed Await Colonoscopy results today. If fever persists and colonoscopy without any source then may get CT images of chest and follow up abd pelvis Per GI hold off on bleeding scan and order if bleeding starts again Monitor H/H every 6 hrs, transfuse if Hgb < 7 
IV Fluid: NS at 100 cc/hr. Monitor hemodynamics, UO Stress ulcer prophylaxis - PPI 
DVT prophylaxis - SCDs, avoid pharmacological prophylaxis AM labs. Diet - as tolerated Smoking cessation counseled for 5 minutes. Patient agrees to set a quit date and work on weaning herself Will defer respective systems problem management to primary and other consultant and follow patient in ICU with primary and other medical team 
Further recommendations will be based on the patient's response to recommended treatment and results of the investigation ordered. Quality Care: PPI, DVT prophylaxis, HOB elevated, Infection control all reviewed and addressed. PAIN AND SEDATION: none · Skin/Wound: none · Nutrition: as tolerated · Prophylaxis: DVT and GI Prophylaxis reviewed. · Restraints: none 
· PT/OT eval and treat: as needed · Lines/Tubes: lines R IJ 7/25/18, durham none, Ventilator none ADVANCE DIRECTIVE: Full code FAMILY DISCUSSION: Spoke with patient Events and notes from last 24 hours reviewed. Care plan discussed with nursing Subjective/History:  
Patient is a 37 y.o. female with PMhx for seizure d/o, substance abuse, non-adherence to medication presented to ED with finding of BRBPR. In ED, she had multiple more BM with BRBPR. Some abdominal cramping when she feels BM coming up and relieved after BM. Labs showed some lowering of Hgb compared to her labs in 2017 at [facility]. No prior similar episode, no trauma or sexual abuse, use of foreign body in rectum, hx of PUD or cancer, use of NSAIDs or blood thinner, alcohol abuse, liver disease, fever, chills, abdominal pain, anorexia, nausea or vomiting, dysphagia, recent change in bowel habits or black or bloody stools or weight loss or antibiotics use. She reports last night she spent with a male friend at his house and drank some coffee. She does not recall details of last night events. Further work up is pending. 7/27/18 No further bleeding episode overnight Didn't require further PRBC transfusion. Remained hemodynamic stability The patient denies abdominal or flank pain, anorexia, nausea or vomiting, dysphagia. The patient denies cough, chest pain, dyspnea, wheezing or hemoptysis. Episode of fever this AM. No HA, neck stiffness, photophobia, sore throat, sinus pain or discharge, phlegm, dysuria, nausea, vomiting, rash, adenopathy, leg swelling or calf tenderness, vaginal discharge. Good urine output. Having BM from bowel prep for colonoscopy today Review of Systems: 
General ROS: negative for  - fever, chills, weight loss, fatigue and malaise Cardiovascular ROS: negative for - chest pain, edema, murmur, orthopnea, palpitations or paroxysmal nocturnal dyspnea Dermatological ROS: negative for - pruritus, rash or skin lesion changes Past Medical History: 
Past Medical History:  
Diagnosis Date  History of appendectomy  SAH (subarachnoid hemorrhage) ([facility] [address]  Substance abuse Past Surgical History: 
Past Surgical History:  
Procedure Laterality Date  HX APPENDECTOMY  HX  SECTION    
 HX TRACHEOSTOMY Medications: 
Prior to Admission medications Not on File Current Facility-Administered Medications Medication Dose Route Frequency  calcium gluconate 2 g in 0.9% sodium chloride 100 mL IVPB  2 g IntraVENous ONCE  
 magnesium sulfate 2 g/50 ml IVPB (premix or compounded)  2 g IntraVENous ONCE  
 nicotine (NICODERM CQ) 14 mg/24 hr patch 1 Patch  1 Patch TransDERmal DAILY  pantoprazole (PROTONIX) 40 mg in sodium chloride 0.9% 10 mL injection  40 mg IntraVENous DAILY  0.9% sodium chloride infusion  100 mL/hr IntraVENous CONTINUOUS Allergy: No Known Allergies Social History: 
Social History Substance Use Topics  Smoking status: Current Every Day Smoker Packs/day: 1.00  Smokeless tobacco: Never Used  Alcohol use Yes Family History: 
History reviewed. Family history of mother with cancer of unknown type. Objective:  
Vital Signs:   
Blood pressure 114/72, pulse 91, temperature 99.3 °F (37.4 °C), resp. rate 22, height 5' 9\" (1.753 m), weight 49.9 kg (110 lb), last menstrual period 2018, SpO2 99 %. Body mass index is 16.24 kg/(m^2). O2 Device: Room air Temp (24hrs), Av.2 °F (37.3 °C), Min:97.9 °F (36.6 °C), Max:101.1 °F (38.4 °C) Intake/Output:  
Last shift:        
Last 3 shifts: 1901 -  0700 In: 3265.4 [P.O.:2650] Out: 3050 [KXYYL:1547] Intake/Output Summary (Last 24 hours) at 07/27/18 6299 Last data filed at 07/26/18 9331 Gross per 24 hour Intake             2400 ml Output             1700 ml Net              700 ml Physical Exam: 
General: AAO x 3, no respiratory distress, alert, comfortable looking, no distress, appears stated age, on room air HEENT: PERRLA, EOMI, fundi benign, throat normal without erythema or exudate Neck: No abnormally enlarged lymph nodes or thyroid, supple Chest: normal 
Lungs: moderate air entry, clear to auscultation bilaterally, normal percussion bilaterally, no tenderness/ rash Heart: Regular rate and rhythm, S1S2 present or without murmur or extra heart sounds Abdomen: non distended, bowel sounds normoactive, tympanic, abdomen soft, no tenderness or sensitivity, masses, organomegaly or guarding, rigidity, rebound Extremity: negative for edema, cyanosis, clubbing Neuro: alert, oriented x 3, no defects noted in general exam. 
Skin: Skin color, texture, turgor fair. Skin dry, non-diaphoretic Data:  
 
Recent Results (from the past 24 hour(s)) HGB & HCT Collection Time: 07/26/18 11:50 AM  
Result Value Ref Range HGB 8.3 (L) 12.0 - 16.0 g/dL HCT 25.4 (L) 35.0 - 45.0 % POTASSIUM Collection Time: 07/26/18 11:50 AM  
Result Value Ref Range Potassium 4.2 3.5 - 5.5 mmol/L MAGNESIUM Collection Time: 07/26/18 11:50 AM  
Result Value Ref Range Magnesium 1.9 1.6 - 2.6 mg/dL HGB & HCT Collection Time: 07/26/18  6:30 PM  
Result Value Ref Range HGB 7.6 (L) 12.0 - 16.0 g/dL HCT 23.4 (L) 35.0 - 45.0 % POTASSIUM Collection Time: 07/27/18  5:35 AM  
Result Value Ref Range Potassium 3.7 3.5 - 5.5 mmol/L MAGNESIUM Collection Time: 07/27/18  5:35 AM  
Result Value Ref Range Magnesium 1.5 (L) 1.6 - 2.6 mg/dL PHOSPHORUS Collection Time: 07/27/18  5:35 AM  
Result Value Ref Range Phosphorus 1.7 (L) 2.5 - 4.9 MG/DL  
HGB & HCT  Collection Time: 07/27/18  5:35 AM  
Result Value Ref Range HGB 7.5 (L) 12.0 - 16.0 g/dL HCT 23.0 (L) 35.0 - 45.0 % CALCIUM, IONIZED Collection Time: 07/27/18  6:19 AM  
Result Value Ref Range Ionized Calcium 1.07 (L) 1.12 - 1.32 MMOL/L No results for input(s): FIO2I, IFO2, HCO3I, IHCO3, HCOPOC, PCO2I, PCOPOC, IPHI, PHI, PHPOC, PO2I, PO2POC in the last 72 hours. No lab exists for component: IPOC2 All Micro Results Procedure Component Value Units Date/Time CULTURE, BLOOD [[id number]] Collected:  07/27/18 0133 Order Status:  Completed Specimen:  Blood from Blood Updated:  07/27/18 5784 CULTURE, BLOOD [[id number]] Collected:  07/27/18 8963 Order Status:  Completed Specimen:  Blood from Blood Updated:  07/27/18 5978 C. DIFFICILE/EPI PCR [[id number]] Order Status:  Sent Specimen:  Stool Telemetry: normal sinus rhythm Imaging: 
[x]I have personally reviewed the patients chest radiographs images and report 7/27/18 Results from [facility] Encounter encounter on 07/25/18 XR CHEST PORT Narrative EXAM:Chest X-Ray History: Cough and fever Technique:  Portable Frontal View Comparison: 07/25/2018, 09/09/2014 
 
_______________ FINDINGS: 
-Right-sided IJ central catheter is stable. Stable cardiomediastinal silhouette and hilar structures. Pulmonary hyperinflation with linear/ discoid right superhilar opacity. No 
discrete left lung opacity. No pneumothorax or effusion. Both diaphragmatic 
margins and costophrenic angles are sharp. Stable intact osseous structures. No interval change in the focal nodular 
density projecting over the right anterior fifth rib. . 
 
_______________ Impression IMPRESSION: 
 
1. Paramedial right suprahilar minimal atelectasis versus potential skinfold 
artifact. Results from [facility] Encounter encounter on 07/25/18 CTA ABDOMEN PELV W CONT Narrative EXAM: CTA abdomen and pelvis INDICATION: Rectal bleeding suspecting internal bleeding anemia COMPARISON: None. TECHNIQUE: Axial CT imaging from the dome of the diaphragms to the pubic 
symphysis with and without intravenous contrast. Coronal and sagittal MIP 
reformats were generated. DOSE REDUCTION:  One or more dose reduction techniques were used on this CT: 
automated exposure control, adjustment of the mAs and/or kVp according to 
patient's size, and iterative reconstruction techniques. The specific techniques 
utilized on this CT exam have been documented in the patient's electronic 
medical record. 
 
_______________ FINDINGS: 
 
Lung bases: Unremarkable Aorta and major vessels: Unenhanced images demonstrate no mural thrombus in the 
aorta. Abdominal aorta is normal in caliber. Postcontrast images demonstrate no 
aortic dissection. Celiac artery, superior mesenteric artery, renal arteries, 
inferior mesenteric artery, iliac arteries and proximal femoral arteries are 
patent. ABDOMEN and pelvis:  
 
Liver: There is moderate hepatic steatosis. Multiple rounded low attenuations 
are seen in the liver. Image 24 right hepatic lobe demonstrates a 1.5 cm rounded 
low-attenuation with a peripheral calcification. Image 33 demonstrates a 11 mm 
rounded low-attenuation left hepatic lobe. Image 36 demonstrates a 11 mm 
low-attenuation left hepatic lobe. There is a peripheral low-attenuation the 
right hepatic lobe measuring 24 x 17 mm and this is suggestive a hemangioma with 
peripheral nodular pooling. Gallbladder is unremarkable. There is no bile duct dilatation. Pancreas: 
Unremarkable Pancreas: Unremarkable Spleen: Unremarkable Adrenals: Unremarkable Kidneys: Unremarkable Lymph nodes: No enlarged lymph nodes seen. Bowel: Mild colonic diverticulosis present. Moderate amount of stool present 
throughout the colon.  There is a 9 mm high density focus in the lumen of the 
ascending colon on image 77 and another smaller high density focus seen in the 
cecum measuring 6 cm. These are suggestive of ingested high density material 
possibly bone or foreign body. There is colonic wall thickening of the proximal 
transverse colon and ascending colon which may reflect under distention versus 
colitis. Definite active intraluminal hemorrhage not identified. No bowel 
obstruction seen. Pelvic organs: Uterus is unremarkable. The urinary bladder is under distended 
therefore difficult to evaluate. No free fluid seen. There is incidental note of a small periumbilical hernia with fat content. OTHER: No acute or aggressive osseous abnormalities identified. _______________ Impression IMPRESSION: 
 
No evidence of an abdominal aortic aneurysm. Abdominal vessels are patent. No 
definite active extravasation seen. If there still clinical concern for 
intraluminal hemorrhage and I would recommend a nuclear medicine GI bleeding 
scan for further evaluation since that would be more sensitive. Colonic wall thickening of the transverse colon which may represent 
underdistention versus colitis either infectious or inflammatory Multiple low density lesions in the liver suggesting probable cysts or 
hemangiomas. Ingested high density material in the ascending colon and cecum [x]See my orders for details My assessment, plan of care, findings, medications, side effects etc were discussed with: 
[x]nursing []PT/OT   
[]respiratory therapy []Dr. Angelika Kolb [x]Patient [x]Total critical care time exclusive of procedures 36 minutes with complex decision making performed and > 50% time spent in face to face evaluation.  
 
Radha Khan MD

## 2018-07-27 NOTE — PROGRESS NOTES
NUTRITION FOLLOW-UP 
 
RECOMMENDATIONS/PLAN:  
Adv diet per MD 
Avoid prolonged Clear Liquid diet as this does not meet estimated needs Monitor labs/lytes, diet adv, skin integrity, wt, fluid status, BM 
 
NUTRITION ASSESSMENT:  
Client Update: 37 yrs old Female with gi bleeding, substance abuse, transient hypotension, anemia. Awaiting colonoscopy scheduled for Friday. CTA complete FOOD/NUTRITION INTAKE Diet Order:  Clear Liquids Supplements: Ensure Clear TID Food Allergies: NKFA Average PO Intake:     
Patient Vitals for the past 100 hrs: 
 % Diet Eaten  
07/26/18 1915 100 % 07/26/18 1512 100 % 07/26/18 0800 75 % Pertinent Medications:  [x] Reviewed; Electrolyte Replacement Protocol: [x]K [x]Mg [x]PO4 Insulin:  []SSI  []Pre-meal   []Basal    []Drip  []None Cultural/Hoahaoism Food Preferences: None Identified BIOCHEMICAL DATA & MEDICAL TESTS Pertinent Labs:  [x] Reviewed; Phos-1.7 Mg-1.5   ANTHROPOMETRICS Height: 5' 9\" (175.3 cm)       Weight: 49.9 kg (110 lb) BMI: 16.2 kg/m^2 underweight (Less than or = to 18.5% BMI) Adm Weight: 110 lbs                Weight change: 0lbs Adjusted Body Weight: n/a NUTRITION-FOCUSED PHYSICAL ASSESSMENT Skin: No PU     
GI: No new BM since last review NUTRITION PRESCRIPTION Calories: 3337-7195 kcal/day based on 30-35kcal/kg Protein: 60-75 g/day based on 1.2-1.5 g/kg CHO: 188-219 g/day based on 50% of total energy Fluid: 7521-4638 ml/day based on 1 kcal/ml NUTRITION DIAGNOSES:  
1. Predicted suboptimal energy intake related to inadequate oral intake as evidence by BMI 16.2 
  
NUTRITION INTERVENTIONS:  
INTERVENTIONS:                                                                                                                                                                         GOALS: 
1. Other:Adv diet per MD 1. Adv diet per MD by next review 3 days LEARNING NEEDS (Diet, Supplementation, Food/Nutrient-Drug Interaction): 
 [x] None Identified   [] Education provided/documented      Identified and patient: [] Declined   [] Was not appropriate/indicated NUTRITION MONITORING /EVALUATION:  
Follow PO intake Monitor wt Monitor renal labs, electrolytes, fluid status Monitor for additional supplement needs Previous Recommendations Implemented: no       
Previous Goals Met:  no -diet was not adv; pt scheduled for colonoscopy today  
   
[] Participated in Interdisciplinary Rounds   
[x] [address] [place] [address] Reviewed DISCHARGE NUTRITION RECOMMENDATIONS ADDRESSED:  
  
  [x] To be determined closer to discharge NUTRITION RISK:           [x] At risk                        [] Not currently at risk Will follow-up per policy. Pamella Kevin 9

## 2018-07-27 NOTE — PROGRESS NOTES
Spoke with Dr. Eleazar Trujillo after the colonoscopy. Rectal laceration present, no colitis signs. No active bleeding. He agrees with Levofloxacin. Patient has not provided urine sample for culture. Per GI if bleeding reoccurs then may need surgery to suture tear otherwise stool softener would be okay. Monitor fever. Per discussion with GI no indication for CT scan repeat. Will follow clinical course.  
 
Kenny Babb MD

## 2018-07-27 NOTE — PROGRESS NOTES
attempted an initial visit of the patient, who was sleeping. Spiritual Care materials left at bedside. Chaplains will continue to follow and will provide pastoral care as needed or requested. [address], M.Div. Board Certified [address] [place] [address] Road [phone] - Office

## 2018-07-27 NOTE — PROGRESS NOTES
Maximino Lantigua Assumed responsibility for patient from Darwin Solorzano RN 
 
1800 Report received from [facility] following colonoscopy Bedside shift change report given to Robb Feliciano RN (oncoming nurse) by Noah Montano RN (offgoing nurse). Report included the following information SBAR, Kardex and MAR.

## 2018-07-27 NOTE — PERIOP NOTES
Report given to Luz Weber RN, Fentanyl 100 mcg, Versed 5 mg and Benadryl 50 mg given during the procedure. VSS. O2 sats 98% on RA, patient responds to verbal stimuli.

## 2018-07-27 NOTE — PROGRESS NOTES
1915: Assumed patient care from [facility]. Patient is alert and oriented to person, place, time and situation. Respiratory status is stable on room air. Vital signs are stable. MEWS score is a one. Patient hardik any pain, discomfort, nausea vomiting dizziness or anxiety. White board and fall card is updated. Bed is locked and in lowest position. Call bell, water and personal belongings are within reach. Patient has no questions, comments or concerns after bedside shift report. 2200: Patient was started on 4000 mL of Colyte    2300: Rounded on the patient. The five \"P's\" were addressed. Patient had no request at this time. Colyte was given. Patient appeared to tolerate it well. 0000: Rounded on the patient. The five \"P's\" were addressed. Patient had no request at this time. Colyte was given. Patient appeared to tolerate it well. 0100: Rounded on the patient. The five \"P's\" were addressed. Patient had no request at this time. Colyte was given. Patient appeared to tolerate it well. 0200: Rounded on the patient. The five \"P's\" were addressed. Patient had no request at this time. Colyte was given. Patient appeared to tolerate it well.    0300: Rounded on the patient. The five \"P's\" were addressed. Patient had no request at this time. Colyte was given. Patient appeared to tolerate it well.    0400: Rounded on the patient. The five \"P's\" were addressed. Patient had no request at this time. Colyte was given. Patient appeared to tolerate it well.    0500: Rounded on the patient. The five \"P's\" were addressed. Patient had no request at this time. Colyte was given. Patient appeared to tolerate it well. Patient has a temp of 101.1, so 650 mg of prn liquid Tylenol was given. Patient appeared to tolerate the medication well.     0600: was called and updated on the patient's status. He ordered two sets of blood cultures, a urinalysis, and a portable chest x-ray.      0700: Patient had an uneventful shift. Respiratory status, vital signs and MEWS score remained stable. Patient was resting quietly with no signs of distress noted. Bed locked and in lowest position. Call bell water and personal belongings were within reach. Patient had no questions, comments or concerns after bedside shift report.  Bedside report given to Salvador Bowen R.N.

## 2018-07-28 LAB
ANION GAP SERPL CALC-SCNC: 3 MMOL/L (ref 3–18)
BASOPHILS # BLD: 0 K/UL (ref 0–0.1)
BASOPHILS NFR BLD: 0 % (ref 0–2)
BUN SERPL-MCNC: 2 MG/DL (ref 7–18)
BUN/CREAT SERPL: 3 (ref 12–20)
CA-I SERPL-SCNC: 1.09 MMOL/L (ref 1.12–1.32)
CA-I SERPL-SCNC: 1.17 MMOL/L (ref 1.12–1.32)
CALCIUM SERPL-MCNC: 7.6 MG/DL (ref 8.5–10.1)
CHLORIDE SERPL-SCNC: 105 MMOL/L (ref 100–108)
CO2 SERPL-SCNC: 27 MMOL/L (ref 21–32)
CREAT SERPL-MCNC: 0.74 MG/DL (ref 0.6–1.3)
DIFFERENTIAL METHOD BLD: ABNORMAL
EOSINOPHIL # BLD: 0.1 K/UL (ref 0–0.4)
EOSINOPHIL NFR BLD: 1 % (ref 0–5)
ERYTHROCYTE [DISTWIDTH] IN BLOOD BY AUTOMATED COUNT: 16 % (ref 11.6–14.5)
GLUCOSE SERPL-MCNC: 86 MG/DL (ref 74–99)
HCT VFR BLD AUTO: 22.5 % (ref 35–45)
HGB BLD-MCNC: 7.4 G/DL (ref 12–16)
LYMPHOCYTES # BLD: 1.2 K/UL (ref 0.9–3.6)
LYMPHOCYTES NFR BLD: 28 % (ref 21–52)
MAGNESIUM SERPL-MCNC: 1.8 MG/DL (ref 1.6–2.6)
MAGNESIUM SERPL-MCNC: 1.9 MG/DL (ref 1.6–2.6)
MCH RBC QN AUTO: 27 PG (ref 24–34)
MCHC RBC AUTO-ENTMCNC: 32.9 G/DL (ref 31–37)
MCV RBC AUTO: 82.1 FL (ref 74–97)
MONOCYTES # BLD: 0.3 K/UL (ref 0.05–1.2)
MONOCYTES NFR BLD: 7 % (ref 3–10)
NEUTS SEG # BLD: 2.7 K/UL (ref 1.8–8)
NEUTS SEG NFR BLD: 64 % (ref 40–73)
PHOSPHATE SERPL-MCNC: 3 MG/DL (ref 2.5–4.9)
PLATELET # BLD AUTO: 151 K/UL (ref 135–420)
PMV BLD AUTO: 9.1 FL (ref 9.2–11.8)
POTASSIUM SERPL-SCNC: 3.5 MMOL/L (ref 3.5–5.5)
POTASSIUM SERPL-SCNC: 4.1 MMOL/L (ref 3.5–5.5)
RBC # BLD AUTO: 2.74 M/UL (ref 4.2–5.3)
SODIUM SERPL-SCNC: 135 MMOL/L (ref 136–145)
WBC # BLD AUTO: 4.3 K/UL (ref 4.6–13.2)

## 2018-07-28 PROCEDURE — 83735 ASSAY OF MAGNESIUM: CPT | Performed by: INTERNAL MEDICINE

## 2018-07-28 PROCEDURE — 74011000258 HC RX REV CODE- 258: Performed by: INTERNAL MEDICINE

## 2018-07-28 PROCEDURE — 87040 BLOOD CULTURE FOR BACTERIA: CPT | Performed by: HOSPITALIST

## 2018-07-28 PROCEDURE — 85025 COMPLETE CBC W/AUTO DIFF WBC: CPT | Performed by: INTERNAL MEDICINE

## 2018-07-28 PROCEDURE — 74011250637 HC RX REV CODE- 250/637: Performed by: INTERNAL MEDICINE

## 2018-07-28 PROCEDURE — 65270000029 HC RM PRIVATE

## 2018-07-28 PROCEDURE — 74011250636 HC RX REV CODE- 250/636: Performed by: INTERNAL MEDICINE

## 2018-07-28 PROCEDURE — 84100 ASSAY OF PHOSPHORUS: CPT | Performed by: INTERNAL MEDICINE

## 2018-07-28 PROCEDURE — 87077 CULTURE AEROBIC IDENTIFY: CPT | Performed by: HOSPITALIST

## 2018-07-28 PROCEDURE — 74011250636 HC RX REV CODE- 250/636: Performed by: HOSPITALIST

## 2018-07-28 PROCEDURE — 84132 ASSAY OF SERUM POTASSIUM: CPT | Performed by: INTERNAL MEDICINE

## 2018-07-28 PROCEDURE — C9113 INJ PANTOPRAZOLE SODIUM, VIA: HCPCS | Performed by: INTERNAL MEDICINE

## 2018-07-28 PROCEDURE — 36415 COLL VENOUS BLD VENIPUNCTURE: CPT | Performed by: HOSPITALIST

## 2018-07-28 PROCEDURE — 82330 ASSAY OF CALCIUM: CPT | Performed by: INTERNAL MEDICINE

## 2018-07-28 PROCEDURE — 77010033678 HC OXYGEN DAILY

## 2018-07-28 PROCEDURE — 87186 SC STD MICRODIL/AGAR DIL: CPT | Performed by: HOSPITALIST

## 2018-07-28 PROCEDURE — 74011000258 HC RX REV CODE- 258: Performed by: HOSPITALIST

## 2018-07-28 PROCEDURE — 81001 URINALYSIS AUTO W/SCOPE: CPT | Performed by: INTERNAL MEDICINE

## 2018-07-28 PROCEDURE — 74011250637 HC RX REV CODE- 250/637: Performed by: HOSPITALIST

## 2018-07-28 RX ORDER — SODIUM CHLORIDE 9 MG/ML
1000 INJECTION, SOLUTION INTRAVENOUS CONTINUOUS
Status: DISCONTINUED | OUTPATIENT
Start: 2018-07-28 | End: 2018-07-28

## 2018-07-28 RX ORDER — POTASSIUM CHLORIDE 20 MEQ/1
40 TABLET, EXTENDED RELEASE ORAL ONCE
Status: COMPLETED | OUTPATIENT
Start: 2018-07-28 | End: 2018-07-28

## 2018-07-28 RX ORDER — VANCOMYCIN HYDROCHLORIDE
1250 ONCE
Status: COMPLETED | OUTPATIENT
Start: 2018-07-28 | End: 2018-07-28

## 2018-07-28 RX ORDER — OXYCODONE AND ACETAMINOPHEN 5; 325 MG/1; MG/1
1 TABLET ORAL ONCE
Status: COMPLETED | OUTPATIENT
Start: 2018-07-28 | End: 2018-07-28

## 2018-07-28 RX ORDER — EPINEPHRINE 0.1 MG/ML
1 INJECTION INTRACARDIAC; INTRAVENOUS
Status: DISCONTINUED | OUTPATIENT
Start: 2018-07-28 | End: 2018-07-28

## 2018-07-28 RX ORDER — OXYCODONE AND ACETAMINOPHEN 5; 325 MG/1; MG/1
2 TABLET ORAL ONCE
Status: COMPLETED | OUTPATIENT
Start: 2018-07-28 | End: 2018-07-28

## 2018-07-28 RX ORDER — DEXTROMETHORPHAN/PSEUDOEPHED 2.5-7.5/.8
1.2 DROPS ORAL
Status: DISCONTINUED | OUTPATIENT
Start: 2018-07-28 | End: 2018-07-28

## 2018-07-28 RX ORDER — POTASSIUM CHLORIDE 20 MEQ/1
40 TABLET, EXTENDED RELEASE ORAL
Status: DISCONTINUED | OUTPATIENT
Start: 2018-07-28 | End: 2018-07-28

## 2018-07-28 RX ORDER — MAGNESIUM SULFATE 1 G/100ML
1 INJECTION INTRAVENOUS ONCE
Status: COMPLETED | OUTPATIENT
Start: 2018-07-28 | End: 2018-07-28

## 2018-07-28 RX ORDER — ATROPINE SULFATE 0.1 MG/ML
0.5 INJECTION INTRAVENOUS
Status: DISCONTINUED | OUTPATIENT
Start: 2018-07-28 | End: 2018-07-28

## 2018-07-28 RX ADMIN — LEVOFLOXACIN 500 MG: 5 INJECTION, SOLUTION INTRAVENOUS at 10:44

## 2018-07-28 RX ADMIN — MAGNESIUM SULFATE HEPTAHYDRATE 1 G: 1 INJECTION, SOLUTION INTRAVENOUS at 06:00

## 2018-07-28 RX ADMIN — POTASSIUM CHLORIDE 40 MEQ: 20 TABLET, EXTENDED RELEASE ORAL at 06:06

## 2018-07-28 RX ADMIN — DOCUSATE SODIUM 100 MG: 100 CAPSULE, LIQUID FILLED ORAL at 21:27

## 2018-07-28 RX ADMIN — VANCOMYCIN HYDROCHLORIDE 1250 MG: 10 INJECTION, POWDER, LYOPHILIZED, FOR SOLUTION INTRAVENOUS at 05:01

## 2018-07-28 RX ADMIN — IRON SUCROSE 100 MG: 20 INJECTION, SOLUTION INTRAVENOUS at 13:29

## 2018-07-28 RX ADMIN — SODIUM CHLORIDE 40 MG: 9 INJECTION INTRAMUSCULAR; INTRAVENOUS; SUBCUTANEOUS at 08:31

## 2018-07-28 RX ADMIN — DOCUSATE SODIUM 100 MG: 100 CAPSULE, LIQUID FILLED ORAL at 08:30

## 2018-07-28 RX ADMIN — CALCIUM GLUCONATE 2 G: 94 INJECTION, SOLUTION INTRAVENOUS at 07:12

## 2018-07-28 RX ADMIN — OXYCODONE HYDROCHLORIDE AND ACETAMINOPHEN 2 TABLET: 5; 325 TABLET ORAL at 21:27

## 2018-07-28 RX ADMIN — OXYCODONE HYDROCHLORIDE AND ACETAMINOPHEN 1 TABLET: 5; 325 TABLET ORAL at 02:31

## 2018-07-28 NOTE — PROGRESS NOTES
0222. Anju dolan MD d/t pt c/o 10/10 Rt. wrist pain. Pt has Tylenol ordered; however, states that pain is too bad for Tylenol. 5163. Anju Blandon Spoke with Dr. Malina Rahman about pain in wrist. Also advised him there +1 edema to that wrist. Per Dr. Malina Rahman, give one time dose of Percocet 5-325 mg PO.  Also, elevate wrist.

## 2018-07-28 NOTE — PROGRESS NOTES
0730- Bedside and Verbal shift change report given to Nicolle Mclaughlin RN (oncoming nurse) by Esteban Banda RN (offgoing nurse). Report included the following informatiSRSBAR, Kardex, Intake/Output, MAR, Recent Results and Cardiac Rhythm SR.  
 
0800- Initial shift assessment complete, NAD. Will continue to monitor. 1200- Reassessment complete, no changes. 1600- Reassessment complete, no changes. Central line removed per Dr Luzma Langston orders prior to transferring out of ICU. Pressure held as appropriate, no sign of bleeding or hematoma. Sterile guaze and tegaderm in place. Will continue to monitor. 1700-TRANSFER - OUT REPORT: 
 
Verbal report given to Christiane Barboza RN(name) on [facility]  being transferred to (unit) for routine progression of care Report consisted of patients Situation, Background, Assessment and  
Recommendations(SBAR). Information from the following report(s) SBAR and Cardiac Rhythm SR was reviewed with the receiving nurse. Lines:  
Peripheral IV 07/25/18 Left Forearm (Active) Site Assessment Clean, dry, & intact 7/28/2018  8:00 AM  
Phlebitis Assessment 0 7/28/2018  8:00 AM  
Infiltration Assessment 0 7/28/2018  8:00 AM  
Dressing Status Clean, dry, & intact 7/28/2018  8:00 AM  
Dressing Type Transparent;Tape 7/28/2018  8:00 AM  
Hub Color/Line Status Capped 7/28/2018  8:00 AM  
Action Taken Open ports on tubing capped 7/28/2018  8:00 AM  
Alcohol Cap Used Yes 7/28/2018  8:00 AM  
  
 
Opportunity for questions and clarification was provided. Patient transported with: 
 Registered Nurse

## 2018-07-28 NOTE — PROGRESS NOTES
Pt upset that she is placed on contact precautions for history of MRSA. Pt states that she has never had MRSA and is refusing care.

## 2018-07-28 NOTE — PROGRESS NOTES
Pt arrived to the unit via wheelchair. Skin assessment completed with Nurse, Sky Pemberton. No skin breakdown noted. Scars noted to upper, lower extremities, and buttocks.

## 2018-07-28 NOTE — PROGRESS NOTES
Pharmacy Dosing Services: Vancomycin Consult for Vancomycin Dosing by Pharmacy by Dr. Shruthi Finnegan Consult provided for this 37y.o. year old female , for indication of gram + cocci infection. Day of Therapy 1 Ht Readings from Last 1 Encounters:  
07/26/18 175.3 cm (69\") Wt Readings from Last 1 Encounters:  
07/26/18 49.9 kg (110 lb) Significant Cultures Pending from micro lab Serum Creatinine Lab Results Component Value Date/Time Creatinine 0.69 07/26/2018 06:01 AM  
 Creatinine (POC) 0.9 04/20/2013 12:11 AM  
  
Creatinine Clearance Estimated Creatinine Clearance: 81.1 mL/min (based on Cr of 0.69). BUN Lab Results Component Value Date/Time BUN 5 (L) 07/26/2018 06:01 AM  
 BUN (POC) <3 (L) 04/20/2013 12:11 AM  
  
WBC Lab Results Component Value Date/Time WBC 4.3 (L) 07/28/2018 04:00 AM  
  
H/H Lab Results Component Value Date/Time HGB 7.4 (L) 07/28/2018 04:00 AM  
  
Platelets Lab Results Component Value Date/Time PLATELET 518 79/44/2807 04:00 AM  
  
Temp 97.7 °F (36.5 °C) Start Vancomycin therapy, with loading dose of 1250 mg at 0500/ 07/28/2018 Follow with maintenance dose of 750 mg at 1700/07/28/07/28/2018  , every 12 hours (frequency). Dose calculated to approximate a therapeutic trough of 15-20mcg/mL. Pharmacy to follow daily and will make changes to dose and/or frequency based on clinical status. Estimated Pharmacokinetic Parameters (based on population kinetics) Vd: 35 L (0.7 L/kg) Phil: 0.062 hr-1 (T1/2 = 11.2 hrs) Dosing Recommendations Vancomycin dose: 750 mg IV Q12hrs (infused over 1 hr) Estimated peak: 39.7 mcg/mL Estimated trough: 20.1 mcg/mL Estimated AUC:VINNIE: 693 mcg*hr/mL (assumed VINNIE 1 mcg/mL) A/P:  
1. Recommend vancomycin 750 mg IV Q12hrs (15 mg/kg) 2. Consider a vancomycin trough level prior to the 4th dose. 3. Please monitor renal function (urine output, BUN/SCr).  Dose adjustments may be necessary with a significant change in renal function. 4. Vancomycin loading dose of 1250 mg to facilitate rapid attainment of target trough serum vancomycin levels.  
 
 
Pharmacist INGA [employer]

## 2018-07-28 NOTE — PROGRESS NOTES
Gastrointestinal Progress Note Patient Name: Lavon Allen Today's Date: 7/28/2018 Admit Date: 7/25/2018 Subjective:  
 
Diet: Patient is on Regular and is tolerating. Nausea is not present. Vomiting is not present. Pain: Patient does not complain of abdominal pain. Rather right fore arm  pain is located at the IV site where there is a red edema suggestive of IV site induced cellulitis Bowel Movements: Normal 
 
Bleeding:  None Current Facility-Administered Medications Medication Dose Route Frequency  vancomycin (VANCOCIN) 750 mg in 0.9% sodium chloride (MBP/ADV) 250 mL ADV  750 mg IntraVENous Q12H  Vancomycin- pharmacy to dose  1 Each Other Rx Dosing/Monitoring  iron sucrose (VENOFER) 100 mg in 0.9% sodium chloride 100 mL IVPB  100 mg IntraVENous Q24H  
 midazolam (VERSED) injection    PRN  
 fentaNYL citrate (PF) injection    PRN  
 diphenhydrAMINE (BENADRYL) injection    PRN  
 docusate sodium (COLACE) capsule 100 mg  100 mg Oral BID  levoFLOXacin (LEVAQUIN) 500 mg in D5W IVPB  500 mg IntraVENous Q24H  
 metoclopramide HCl (REGLAN) injection 5 mg  5 mg IntraVENous Q6H PRN  
 ELECTROLYTE REPLACEMENT PROTOCOL-Potassium Standard Dosing  1 Each Other PRN  
 ELECTROLYTE REPLACEMENT PROTOCOL-Magnesium  1 Each Other PRN  
 ELECTROLYTE REPLACEMENT PROTOCOL-Phosphorus Standard Dosing  1 Each Other PRN  
 ELECTROLYTE REPLACEMENT PROTOCOL-Calcium  1 Each Other PRN  
 LORazepam (ATIVAN) injection 1 mg  1 mg IntraVENous Q4H PRN  
 nicotine (NICODERM CQ) 14 mg/24 hr patch 1 Patch  1 Patch TransDERmal DAILY  pantoprazole (PROTONIX) 40 mg in sodium chloride 0.9% 10 mL injection  40 mg IntraVENous DAILY  0.9% sodium chloride infusion 250 mL  250 mL IntraVENous PRN  
 acetaminophen (TYLENOL) solution 650 mg  650 mg Oral Q4H PRN  
 diphenhydrAMINE (BENADRYL) capsule 25 mg  25 mg Oral Q6H PRN Objective:  
 
Visit Vitals  BP 96/61  Pulse 82  Temp 98.4 °F (36.9 °C)  Resp 20  
 Ht 5' 9\" (1.753 m)  Wt 49.9 kg (110 lb)  SpO2 100%  BMI 16.24 kg/m2  
 
 
07/26 1901 - 07/28 0700 In: 240 [P.O.:240] Out: 5424 [Urine:2425] General appearance: alert, cooperative, mild distress, appears stated age, pale Abdomen: soft, non-tender. Bowel sounds normal. No masses,  no organomegaly Extremities: localized tender edema at previous iv site right upper forearm. Data Review: 
 
Labs: Results:  
   
Chemistry Recent Labs  
   07/28/18 
 1100  07/28/18 
 0400  07/27/18 
 1430   07/26/18 
 0601 GLU   --   86   --    --   77 NA   --   135*   --    --   140  
K  4.1  3.5  3.9   < >  3.6 CL   --   105   --    --   110* CO2   --   27   --    --   25 BUN   --   2*   --    --   5* CREA   --   0.74   --    --   0.69 CA   --   7.6*   --    --   7.3* AGAP   --   3   --    --   5  
BUCR   --   3*   --    --   7* AP   --    --    --    --   32* TP   --    --    --    --   4.0* ALB   --    --    --    --   2.0*  
GLOB   --    --    --    --   2.0 AGRAT   --    --    --    --   1.0  
 < > = values in this interval not displayed. CBC w/Diff Recent Labs  
   07/28/18 
 0400  07/27/18 
 1143  07/27/18 
 0535   07/26/18 
 0601 WBC  4.3*  3.8*   --    --   6.1  
RBC  2.74*  3.01*   --    --   3.00* HGB  7.4*  8.0*  7.5*   < >  7.9*  
HCT  22.5*  24.7*  23.0*   < >  24.5*  
PLT  151  173   --    --   193 GRANS  64  71   --    --    --   
LYMPH  28  20*   --    --    --   
EOS  1  0   --    --    --   
 < > = values in this interval not displayed. Coagulation No results for input(s): PTP, INR, APTT in the last 72 hours. No lab exists for component: INREXT Liver Enzymes Recent Labs  
   07/26/18 
 0601 TP  4.0* ALB  2.0*  
AP  32* SGOT  9* Assessment:  
 
Principal Problem: 
  Rectal bleed (7/25/2018) Active Problems: 
  Adjustment disorder with mixed anxiety and depressed mood (4/20/2013) Anemia (7/25/2018) Transient hypotension (7/25/2018) Substance abuse (7/26/2018) Smoker (7/26/2018) Hypomagnesemia (7/27/2018) Hypophosphatemia (7/27/2018) Plan:  
 
Significant Rectal bleeding started the morning of July 25, caused by a small distal rectal traumatic laceration 12 mm. no bleeding for 3 days. Plan can be discharged on Colace 100 mg bid  For at least 2 weeks to keep stools soft. In case of recurrence of severe bleeding. Acute blood loss anemia 7.4 after 2 blood transfusions. She got today Iron infusion before discharge Multi-substance abuse New onset nephrotic syndrome with Albuminuria and hypoalbuminemia to 2 not sure about the cause may have to see a nephrologist? Repeat urine analysis Bipolar disorder not on medication Smoker Blood culture gram positive cocci probably related to her arm cellulitis she is already on Vancomycine and Levaquin Tim Kaiser MD 
July 28, 2018

## 2018-07-28 NOTE — PROGRESS NOTES
[facility] Pulmonary, Critical Care, and Sleep Medicine Name: Marylee Heidelberg MRN: [id number] : 1975 Hospital: [facility] [place] Date: 2018 Pulmonary Critical Care F/Up note IMPRESSION:  
· Lower GI bleeding with rectal laceration · Anemia, 2' to blood loss · Transverse colonic wall thickening - underdistention versus colitis of infectious or inflammatory etiology · Ingested high density material of unclear significance in the ascending colon and cecum · Fever, source rectal laceration vs UTI; CXR without significant findings · Hx of seizure, took herself off of Dilantin · Substance abuse · Nodular density projecting over the right 5th anterior rib possibly prominent costochondral cartilage · Hx of right sided pneumothorax · Nicotine dependence RECOMMENDATIONS:  
Compensated respiratory status. On room air. Monitor for goal SPO2> 90% Aspiration prevention bundle, head of the bed at 30' all times, pulmonary hygiene care Follow up oblique or PA and lat chest imaging when out of ICU given active smoking No symptoms or signs of acute abdomen. No colitis on colonoscopic exam 
Levofloxacin, vancomycin for fever. Narrow spectrum per culture results If fever persist then may need CT abd/pelvis Per GI continue stool softener and if bleeding starts again then may need colorectal surgery Transfuse if Hgb < 7 Stress ulcer prophylaxis - PPI 
DVT prophylaxis - SCDs, avoid pharmacological prophylaxis AM labs. Diet - as tolerated Possible transfer to floor today Smoking cessation counseled. Patient agrees to set a quit date and work on weaning herself Will defer respective systems problem management to primary and other consultant and will sign off once patient out of ICU Further recommendations will be based on the patient's response to recommended treatment and results of the investigation ordered.  
Quality Care: PPI, DVT prophylaxis, HOB elevated, Infection control all reviewed and addressed. PAIN AND SEDATION: none · Skin/Wound: none · Nutrition: as tolerated · Prophylaxis: DVT and GI Prophylaxis reviewed. · Restraints: none 
· PT/OT eval and treat: as needed · Lines/Tubes: lines R IJ 7/25/18- remove after 2 PIV, durham none, Ventilator none ADVANCE DIRECTIVE: Full code FAMILY DISCUSSION: Spoke with patient Events and notes from last 24 hours reviewed. Care plan discussed with nursing Subjective/History:  
Patient is a 37 y.o. female with PMhx for seizure d/o, substance abuse, non-adherence to medication presented to ED with finding of BRBPR. In ED, she had multiple more BM with BRBPR. Some abdominal cramping when she feels BM coming up and relieved after BM. Labs showed some lowering of Hgb compared to her labs in 2017 at [facility]. No prior similar episode, no trauma or sexual abuse, use of foreign body in rectum, hx of PUD or cancer, use of NSAIDs or blood thinner, alcohol abuse, liver disease, fever, chills, abdominal pain, anorexia, nausea or vomiting, dysphagia, recent change in bowel habits or black or bloody stools or weight loss or antibiotics use. She reports last night she spent with a male friend at his house and drank some coffee. She does not recall details of last night events. Further work up is pending. 7/28/18 No bleeding episode overnight. Colonoscopy showed rectal laceration without active bleeding Didn't require further PRBC transfusion. Remained hemodynamic stability The patient denies abdominal or flank pain, anorexia, nausea or vomiting, dysphagia. The patient denies cough, chest pain, dyspnea, wheezing or hemoptysis. Episodes of fever yesterday but none overnight and AM. No HA, neck stiffness, photophobia, sore throat, sinus pain or discharge, phlegm, dysuria, nausea, vomiting, rash, adenopathy, leg swelling or calf tenderness, vaginal discharge. Good urine output.  I was not contacted from staff about the patient regarding anything overnight Review of Systems: 
General ROS: negative for  - fever, chills, weight loss, fatigue and malaise Cardiovascular ROS: negative for - chest pain, edema, murmur, orthopnea, palpitations or paroxysmal nocturnal dyspnea Dermatological ROS: negative for - pruritus, rash or skin lesion changes Past Medical History: 
Past Medical History:  
Diagnosis Date  History of appendectomy  SAH (subarachnoid hemorrhage) ([facility] [address]  Substance abuse Past Surgical History: 
Past Surgical History:  
Procedure Laterality Date  HX APPENDECTOMY  HX  SECTION    
 HX TRACHEOSTOMY Medications: 
Prior to Admission medications Not on File Current Facility-Administered Medications Medication Dose Route Frequency  vancomycin (VANCOCIN) 750 mg in 0.9% sodium chloride (MBP/ADV) 250 mL ADV  750 mg IntraVENous Q12H  Vancomycin- pharmacy to dose  1 Each Other Rx Dosing/Monitoring  iron sucrose (VENOFER) 100 mg in 0.9% sodium chloride 100 mL IVPB  100 mg IntraVENous Q24H  
 docusate sodium (COLACE) capsule 100 mg  100 mg Oral BID  levoFLOXacin (LEVAQUIN) 500 mg in D5W IVPB  500 mg IntraVENous Q24H  
 nicotine (NICODERM CQ) 14 mg/24 hr patch 1 Patch  1 Patch TransDERmal DAILY  pantoprazole (PROTONIX) 40 mg in sodium chloride 0.9% 10 mL injection  40 mg IntraVENous DAILY Allergy: No Known Allergies Social History: 
Social History Substance Use Topics  Smoking status: Current Every Day Smoker Packs/day: 1.00  Smokeless tobacco: Never Used  Alcohol use Yes Family History: 
History reviewed. Family history of mother with cancer of unknown type. Objective:  
Vital Signs:   
Blood pressure 103/62, pulse 70, temperature 98 °F (36.7 °C), resp. rate 13, height 5' 9\" (1.753 m), weight 49.9 kg (110 lb), last menstrual period 2018, SpO2 97 %.  Body mass index is 16.24 kg/(m^2). O2 Device: Room air Temp (24hrs), Av.8 °F (37.1 °C), Min:97.7 °F (36.5 °C), Max:100.7 °F (38.2 °C) Intake/Output:  
Last shift:      701 - 1900 In: 720 [P.O.:720] Out: - Last 3 shifts: 1901 -  07 In: 240 [P.O.:240] Out: 4229 [Urine:2425] Intake/Output Summary (Last 24 hours) at 18 1130 Last data filed at 18 1039 Gross per 24 hour Intake              720 ml Output             2225 ml Net            -1505 ml Physical Exam: 
General: AAO x 3, no respiratory distress, alert, comfortable looking, no distress, appears stated age, on room air HEENT: PERRLA, EOMI, fundi benign, throat normal without erythema or exudate Neck: No abnormally enlarged lymph nodes or thyroid, supple Chest: normal 
Lungs: moderate air entry, clear to auscultation bilaterally, normal percussion bilaterally, no tenderness/ rash Heart: Regular rate and rhythm, S1S2 present or without murmur or extra heart sounds Abdomen: non distended, bowel sounds normoactive, tympanic, abdomen soft, no tenderness or sensitivity, masses, organomegaly or guarding, rigidity, rebound Extremity: negative for edema, cyanosis, clubbing Neuro: alert, oriented x 3, no defects noted in general exam. 
Skin: Skin color, texture, turgor fair. Skin dry, non-diaphoretic Data:  
 
Recent Results (from the past 24 hour(s)) CBC WITH AUTOMATED DIFF Collection Time: 18 11:43 AM  
Result Value Ref Range WBC 3.8 (L) 4.6 - 13.2 K/uL  
 RBC 3.01 (L) 4.20 - 5.30 M/uL HGB 8.0 (L) 12.0 - 16.0 g/dL HCT 24.7 (L) 35.0 - 45.0 % MCV 82.1 74.0 - 97.0 FL  
 MCH 26.6 24.0 - 34.0 PG  
 MCHC 32.4 31.0 - 37.0 g/dL  
 RDW 15.9 (H) 11.6 - 14.5 % PLATELET 708 706 - 529 K/uL MPV 8.5 (L) 9.2 - 11.8 FL  
 NEUTROPHILS 71 40 - 73 % LYMPHOCYTES 20 (L) 21 - 52 % MONOCYTES 9 3 - 10 % EOSINOPHILS 0 0 - 5 % BASOPHILS 0 0 - 2 %  
 ABS. NEUTROPHILS 2.6 1.8 - 8.0 K/UL  
 ABS. LYMPHOCYTES 0.8 (L) 0.9 - 3.6 K/UL  
 ABS. MONOCYTES 0.4 0.05 - 1.2 K/UL  
 ABS. EOSINOPHILS 0.0 0.0 - 0.4 K/UL  
 ABS. BASOPHILS 0.0 0.0 - 0.1 K/UL  
 DF AUTOMATED    
POTASSIUM Collection Time: 07/27/18  2:30 PM  
Result Value Ref Range Potassium 3.9 3.5 - 5.5 mmol/L MAGNESIUM Collection Time: 07/27/18  2:30 PM  
Result Value Ref Range Magnesium 1.9 1.6 - 2.6 mg/dL CALCIUM, IONIZED Collection Time: 07/27/18  2:30 PM  
Result Value Ref Range Ionized Calcium 1.08 (L) 1.12 - 1.32 MMOL/L  
MAGNESIUM Collection Time: 07/28/18  4:00 AM  
Result Value Ref Range Magnesium 1.8 1.6 - 2.6 mg/dL PHOSPHORUS Collection Time: 07/28/18  4:00 AM  
Result Value Ref Range Phosphorus 3.0 2.5 - 4.9 MG/DL  
CALCIUM, IONIZED Collection Time: 07/28/18  4:00 AM  
Result Value Ref Range Ionized Calcium 1.09 (L) 1.12 - 1.32 MMOL/L  
CBC WITH AUTOMATED DIFF Collection Time: 07/28/18  4:00 AM  
Result Value Ref Range WBC 4.3 (L) 4.6 - 13.2 K/uL  
 RBC 2.74 (L) 4.20 - 5.30 M/uL HGB 7.4 (L) 12.0 - 16.0 g/dL HCT 22.5 (L) 35.0 - 45.0 % MCV 82.1 74.0 - 97.0 FL  
 MCH 27.0 24.0 - 34.0 PG  
 MCHC 32.9 31.0 - 37.0 g/dL  
 RDW 16.0 (H) 11.6 - 14.5 % PLATELET 486 025 - 672 K/uL MPV 9.1 (L) 9.2 - 11.8 FL  
 NEUTROPHILS 64 40 - 73 % LYMPHOCYTES 28 21 - 52 % MONOCYTES 7 3 - 10 % EOSINOPHILS 1 0 - 5 % BASOPHILS 0 0 - 2 %  
 ABS. NEUTROPHILS 2.7 1.8 - 8.0 K/UL  
 ABS. LYMPHOCYTES 1.2 0.9 - 3.6 K/UL  
 ABS. MONOCYTES 0.3 0.05 - 1.2 K/UL  
 ABS. EOSINOPHILS 0.1 0.0 - 0.4 K/UL  
 ABS. BASOPHILS 0.0 0.0 - 0.1 K/UL  
 DF AUTOMATED METABOLIC PANEL, BASIC Collection Time: 07/28/18  4:00 AM  
Result Value Ref Range Sodium 135 (L) 136 - 145 mmol/L Potassium 3.5 3.5 - 5.5 mmol/L Chloride 105 100 - 108 mmol/L  
 CO2 27 21 - 32 mmol/L Anion gap 3 3.0 - 18 mmol/L Glucose 86 74 - 99 mg/dL BUN 2 (L) 7.0 - 18 MG/DL  Creatinine 0.74 0.6 - 1.3 MG/DL  
 BUN/Creatinine ratio 3 (L) 12 - 20 GFR est AA >60 >60 ml/min/1.73m2 GFR est non-AA >60 >60 ml/min/1.73m2 Calcium 7.6 (L) 8.5 - 10.1 MG/DL No results for input(s): FIO2I, IFO2, HCO3I, IHCO3, HCOPOC, PCO2I, PCOPOC, IPHI, PHI, PHPOC, PO2I, PO2POC in the last 72 hours. No lab exists for component: IPOC2 All Micro Results Procedure Component Value Units Date/Time CULTURE, BLOOD [[id number]] Order Status:  Sent Specimen:  Blood from Blood CULTURE, BLOOD [[id number]] Order Status:  Sent Specimen:  Blood from Blood CULTURE, BLOOD [[id number]] Collected:  07/27/18 8921 Order Status:  Completed Specimen:  Blood from Blood Updated:  07/28/18 1243 Special Requests: NO SPECIAL REQUESTS Culture result: NO GROWTH AFTER 22 HOURS     
 CULTURE, BLOOD [[id number]] Collected:  07/27/18 5447 Order Status:  Completed Specimen:  Blood from Blood Updated:  07/28/18 9006 Special Requests: NO SPECIAL REQUESTS     
  GRAM STAIN      
  GRAM POSITIVE COCCI IN CLUSTERS ANAEROBIC BOTTLE  
        
  SMEAR CALLED TO AND CORRECTLY REPEATED BY: CAMILA [facility] RN ICU TO [place] AT [place] [id number] ON [id number] Culture result:      
  CULTURE IN PROGRESS,FURTHER UPDATES TO FOLLOW [facility]/Susceptibility if indicated C. DIFFICILE/EPI PCR [[id number]] Collected:  07/25/18 1600 Order Status:  Canceled Specimen:  Stool Telemetry: normal sinus rhythm Imaging: 
[x]I have personally reviewed the patients chest radiographs images and report 7/27/18 Results from [facility] Encounter encounter on 07/25/18 XR CHEST PORT Narrative EXAM:Chest X-Ray History: Cough and fever Technique:  Portable Frontal View Comparison: 07/25/2018, 09/09/2014 
 
_______________ FINDINGS: 
-Right-sided IJ central catheter is stable. Stable cardiomediastinal silhouette and hilar structures. Pulmonary hyperinflation with linear/ discoid right superhilar opacity. No 
discrete left lung opacity. No pneumothorax or effusion. Both diaphragmatic 
margins and costophrenic angles are sharp. Stable intact osseous structures. No interval change in the focal nodular 
density projecting over the right anterior fifth rib. . 
 
_______________ Impression IMPRESSION: 
 
1. Paramedial right suprahilar minimal atelectasis versus potential skinfold 
artifact. Results from [facility] [place] Encounter encounter on 07/25/18 CTA ABDOMEN PELV W CONT Narrative EXAM: CTA abdomen and pelvis INDICATION: Rectal bleeding suspecting internal bleeding anemia COMPARISON: None. TECHNIQUE: Axial CT imaging from the dome of the diaphragms to the pubic 
symphysis with and without intravenous contrast. Coronal and sagittal MIP 
reformats were generated. DOSE REDUCTION:  One or more dose reduction techniques were used on this CT: 
automated exposure control, adjustment of the mAs and/or kVp according to 
patient's size, and iterative reconstruction techniques. The specific techniques 
utilized on this CT exam have been documented in the patient's electronic 
medical record. 
 
_______________ FINDINGS: 
 
Lung bases: Unremarkable Aorta and major vessels: Unenhanced images demonstrate no mural thrombus in the 
aorta. Abdominal aorta is normal in caliber. Postcontrast images demonstrate no 
aortic dissection. Celiac artery, superior mesenteric artery, renal arteries, 
inferior mesenteric artery, iliac arteries and proximal femoral arteries are 
patent. ABDOMEN and pelvis:  
 
Liver: There is moderate hepatic steatosis. Multiple rounded low attenuations 
are seen in the liver. Image 24 right hepatic lobe demonstrates a 1.5 cm rounded 
low-attenuation with a peripheral calcification. Image 33 demonstrates a 11 mm 
rounded low-attenuation left hepatic lobe. Image 36 demonstrates a 11 mm 
low-attenuation left hepatic lobe.  There is a peripheral low-attenuation the 
right hepatic lobe measuring 24 x 17 mm and this is suggestive a hemangioma with 
peripheral nodular pooling. Gallbladder is unremarkable. There is no bile duct dilatation. Pancreas: 
Unremarkable Pancreas: Unremarkable Spleen: Unremarkable Adrenals: Unremarkable Kidneys: Unremarkable Lymph nodes: No enlarged lymph nodes seen. Bowel: Mild colonic diverticulosis present. Moderate amount of stool present 
throughout the colon. There is a 9 mm high density focus in the lumen of the 
ascending colon on image 77 and another smaller high density focus seen in the 
cecum measuring 6 cm. These are suggestive of ingested high density material 
possibly bone or foreign body. There is colonic wall thickening of the proximal 
transverse colon and ascending colon which may reflect under distention versus 
colitis. Definite active intraluminal hemorrhage not identified. No bowel 
obstruction seen. Pelvic organs: Uterus is unremarkable. The urinary bladder is under distended 
therefore difficult to evaluate. No free fluid seen. There is incidental note of a small periumbilical hernia with fat content. OTHER: No acute or aggressive osseous abnormalities identified. _______________ Impression IMPRESSION: 
 
No evidence of an abdominal aortic aneurysm. Abdominal vessels are patent. No 
definite active extravasation seen. If there still clinical concern for 
intraluminal hemorrhage and I would recommend a nuclear medicine GI bleeding 
scan for further evaluation since that would be more sensitive. Colonic wall thickening of the transverse colon which may represent 
underdistention versus colitis either infectious or inflammatory Multiple low density lesions in the liver suggesting probable cysts or 
hemangiomas. Ingested high density material in the ascending colon and cecum [x]See my orders for details My assessment, plan of care, findings, medications, side effects etc were discussed with: 
[x]nursing []PT/OT []respiratory therapy []Dr. Cathy Peña [x]Patient [x]High complex decision making performed and > 50% time spent in face to face evaluation.  
 
Courtney Villeda MD

## 2018-07-28 NOTE — PROGRESS NOTES
Bedside and Verbal shift change report given to Ling Pressley (oncoming nurse) by Gatito Culp RN (offgoing nurse). Report included the following information SBAR, Procedure Summary, Intake/Output, MAR and Cardiac Rhythm NSR.

## 2018-07-28 NOTE — PROGRESS NOTES
0311. Brian Youssef MD to inform of blood cx results: gram + cocci in clusters in the anaerobic bottle. 5254. Brian Echavarria spoke with Dr. Rosalinda Green and advised of blood cx results. Per Dr. Rosalinda Green, place pharmacy consult to dose Vancomycin.

## 2018-07-28 NOTE — PROGRESS NOTES
Primary RN notified of critical results: positive blood culture from anaerobic bottle: gram positive cocci and clusters.

## 2018-07-28 NOTE — PROGRESS NOTES
Hospitalist Progress Note-critical care note Patient: Ziyad Cartagena MRN: [id number]  CSN: [id number] YOB: 1975  Age: 37 y.o. Sex: female DOA: 7/25/2018 LOS:  LOS: 3 days Chief complaint: gi bleeding, smoker , substance abuse , transient hypotension , anemia Assessment/Plan Hospital Problems  Date Reviewed: 11/23/2013 Codes Class Noted POA Hypomagnesemia ICD-10-CM: K24.16 
ICD-9-CM: 275.2  7/27/2018 Unknown Hypophosphatemia ICD-10-CM: E83.39 
ICD-9-CM: 275.3  7/27/2018 Unknown Substance abuse ICD-10-CM: F19.10 ICD-9-CM: 305.90  7/26/2018 Unknown Smoker ICD-10-CM: A09.940 ICD-9-CM: 305.1  7/26/2018 Unknown Anemia ICD-10-CM: D64.9 ICD-9-CM: 285.9  7/25/2018 Unknown * (Principal)Rectal bleed ICD-10-CM: K62.5 ICD-9-CM: 569.3  7/25/2018 Unknown Transient hypotension ICD-10-CM: I95.9 ICD-9-CM: 796.3  7/25/2018 Unknown Adjustment disorder with mixed anxiety and depressed mood ICD-10-CM: F43.23 
ICD-9-CM: 309.28  4/20/2013 Yes Rectal bleeding No black stool Colonoscopy done yesterday:There is a moderately deep triangular ulceration in the distal rectum sitting on a hemorrhoid anterior wall  
cta :Colonic wall thickening of the transverse colon which may represent 
underdistention versus colitis either infectious or inflammatory  
Multiple low density lesions in the liver suggesting probable cysts or Hemangiomas. Anemia due to acute blood loss Received two units prbc, will continue h/h, so far stable, will give iron infusion So far h/h stable Substance abuse Prn ativan if case withdrawal. uds , thc/cocaine Smoker Nicotine patch Depression No hi/si Hypotension Resolved. Continue iv infusion Central line placed per intensive  
 
?bacteremia Will repeat cx, and f/u with the sensitivity On vanc and levaquin Disposition :2-3 days Will transfer out of icu Subjective: feel better today. Nurse: no acute issue Review of systems: 
 
General: No fevers or chills. tired, 
Cardiovascular: No chest pain or pressure. No palpitations. Pulmonary: No shortness of breath. Gastrointestinal: No nausea, vomiting. Diarrhea Vital signs/Intake and Output: 
Visit Vitals  BP 96/61  Pulse 70  Temp 98 °F (36.7 °C)  Resp 13  Ht 5' 9\" (1.753 m)  Wt 49.9 kg (110 lb)  SpO2 97%  BMI 16.24 kg/m2 Current Shift:  07/28 0701 - 07/28 1900 In: 720 [P.O.:720] Out: - Last three shifts:  07/26 1901 - 07/28 0700 In: 240 [P.O.:240] Out: 9015 [Urine:2425] Physical Exam: 
General: WD, WN. Alert, cooperative, no acute distress   
HEENT: NC, Atraumatic. PERRLA, anicteric sclerae. Lungs: CTA Bilaterally. No Wheezing/Rhonchi/Rales. Heart:  Regular  rhythm,  No murmur, No Rubs, No Gallops Abdomen: Soft, Non distended, Non tender.  +Bowel sounds, Extremities: No c/c/e Psych:   Not anxious or agitated. Neurologic:  No acute neurological deficit. Labs: Results:  
   
Chemistry Recent Labs  
   07/28/18 
 0400  07/27/18 
 1430  07/27/18 
 0535   07/26/18 
 0601  07/25/18 
 1315 GLU  86   --    --    --   77  130* NA  135*   --    --    --   140  138  
K  3.5  3.9  3.7   < >  3.6  3.5 CL  105   --    --    --   110*  104 CO2  27   --    --    --   25  28 BUN  2*   --    --    --   5*  11  
CREA  0.74   --    --    --   0.69  0.90  
CA  7.6*   --    --    --   7.3*  8.6 AGAP  3   --    --    --   5  6 BUCR  3*   --    --    --   7*  12  
AP   --    --    --    --   32*  51  
TP   --    --    --    --   4.0*  6.1* ALB   --    --    --    --   2.0*  3.1*  
GLOB   --    --    --    --   2.0  3.0 AGRAT   --    --    --    --   1.0  1.0  
 < > = values in this interval not displayed. CBC w/Diff Recent Labs  
   07/28/18 
 0400  07/27/18 
 1143  07/27/18 
 0535   07/26/18 
 0601   07/25/18 
 1315 WBC  4.3*  3.8*   --    -- 6.1   --   7.6 RBC  2.74*  3.01*   --    --   3.00*   --   3.91* HGB  7.4*  8.0*  7.5*   < >  7.9*   < >  10.0* HCT  22.5*  24.7*  23.0*   < >  24.5*   < >  31.6*  
PLT  151  173   --    --   193   --   325 GRANS  64  71   --    --    --    --   64  
LYMPH  28  20*   --    --    --    --   25 EOS  1  0   --    --    --    --   1  
 < > = values in this interval not displayed. Cardiac Enzymes Recent Labs  
   07/25/18 
 1315 CPK  55 CKND1  2.0 Coagulation Recent Labs  
   07/25/18 
 1315 PTP  13.3 INR  1.1 APTT  33.9 Lipid Panel Lab Results Component Value Date/Time Cholesterol, total 122 04/21/2013 06:00 AM  
 HDL Cholesterol 46 04/21/2013 06:00 AM  
 LDL, calculated 58.6 04/21/2013 06:00 AM  
 VLDL, calculated 17.4 04/21/2013 06:00 AM  
 Triglyceride 87 04/21/2013 06:00 AM  
 CHOL/HDL Ratio 2.7 04/21/2013 06:00 AM  
  
BNP No results for input(s): BNPP in the last 72 hours. Liver Enzymes Recent Labs  
   07/26/18 
 0601 TP  4.0* ALB  2.0*  
AP  32* SGOT  9* Thyroid Studies Lab Results Component Value Date/Time TSH 1.12 02/20/2015 07:03 AM  
    
Procedures/imaging: see electronic medical records for all procedures/Xrays and details which were not copied into this note but were reviewed prior to creation of Plan Octavio Mckeon MD

## 2018-07-29 ENCOUNTER — APPOINTMENT (OUTPATIENT)
Dept: VASCULAR SURGERY | Age: 43
DRG: 254 | End: 2018-07-29
Attending: HOSPITALIST
Payer: MEDICAID

## 2018-07-29 PROBLEM — N89.8 VAGINAL IRRITATION: Status: ACTIVE | Noted: 2018-07-29

## 2018-07-29 PROBLEM — L03.113 CELLULITIS OF RIGHT ARM: Status: ACTIVE | Noted: 2018-07-29

## 2018-07-29 LAB
ANION GAP SERPL CALC-SCNC: 7 MMOL/L (ref 3–18)
APPEARANCE UR: CLEAR
ATRIAL RATE: 81 BPM
BILIRUB UR QL: NEGATIVE
BUN SERPL-MCNC: 4 MG/DL (ref 7–18)
BUN/CREAT SERPL: 6 (ref 12–20)
CA-I SERPL-SCNC: 1.04 MMOL/L (ref 1.12–1.32)
CALCIUM SERPL-MCNC: 7.9 MG/DL (ref 8.5–10.1)
CALCULATED P AXIS, ECG09: 72 DEGREES
CALCULATED R AXIS, ECG10: 59 DEGREES
CALCULATED T AXIS, ECG11: 29 DEGREES
CHLORIDE SERPL-SCNC: 104 MMOL/L (ref 100–108)
CO2 SERPL-SCNC: 25 MMOL/L (ref 21–32)
COLOR UR: YELLOW
CREAT SERPL-MCNC: 0.71 MG/DL (ref 0.6–1.3)
DATE LAST DOSE: ABNORMAL
DIAGNOSIS, 93000: NORMAL
EPITH CASTS URNS QL MICRO: NORMAL /LPF (ref 0–5)
GLUCOSE SERPL-MCNC: 94 MG/DL (ref 74–99)
GLUCOSE UR STRIP.AUTO-MCNC: NEGATIVE MG/DL
HCT VFR BLD AUTO: 24.5 % (ref 35–45)
HGB BLD-MCNC: 7.9 G/DL (ref 12–16)
HGB UR QL STRIP: NEGATIVE
KETONES UR QL STRIP.AUTO: NEGATIVE MG/DL
LEUKOCYTE ESTERASE UR QL STRIP.AUTO: NEGATIVE
MAGNESIUM SERPL-MCNC: 1.7 MG/DL (ref 1.6–2.6)
NITRITE UR QL STRIP.AUTO: NEGATIVE
P-R INTERVAL, ECG05: 120 MS
PH UR STRIP: 7.5 [PH] (ref 5–8)
PHOSPHATE SERPL-MCNC: 4.3 MG/DL (ref 2.5–4.9)
POTASSIUM SERPL-SCNC: 3.8 MMOL/L (ref 3.5–5.5)
PROT UR STRIP-MCNC: NEGATIVE MG/DL
Q-T INTERVAL, ECG07: 404 MS
QRS DURATION, ECG06: 70 MS
QTC CALCULATION (BEZET), ECG08: 469 MS
REPORTED DOSE,DOSE: ABNORMAL UNITS
REPORTED DOSE/TIME,TMG: 608
SODIUM SERPL-SCNC: 136 MMOL/L (ref 136–145)
SP GR UR REFRACTOMETRY: 1.01 (ref 1–1.03)
UROBILINOGEN UR QL STRIP.AUTO: 0.2 EU/DL (ref 0.2–1)
VANCOMYCIN TROUGH SERPL-MCNC: 3.6 UG/ML (ref 10–20)
VENTRICULAR RATE, ECG03: 81 BPM
WBC URNS QL MICRO: NORMAL /HPF (ref 0–5)

## 2018-07-29 PROCEDURE — 84100 ASSAY OF PHOSPHORUS: CPT | Performed by: INTERNAL MEDICINE

## 2018-07-29 PROCEDURE — 74011250637 HC RX REV CODE- 250/637: Performed by: INTERNAL MEDICINE

## 2018-07-29 PROCEDURE — 74011250636 HC RX REV CODE- 250/636: Performed by: HOSPITALIST

## 2018-07-29 PROCEDURE — C9113 INJ PANTOPRAZOLE SODIUM, VIA: HCPCS | Performed by: INTERNAL MEDICINE

## 2018-07-29 PROCEDURE — 74011250637 HC RX REV CODE- 250/637: Performed by: HOSPITALIST

## 2018-07-29 PROCEDURE — 83735 ASSAY OF MAGNESIUM: CPT | Performed by: INTERNAL MEDICINE

## 2018-07-29 PROCEDURE — 74011250636 HC RX REV CODE- 250/636: Performed by: INTERNAL MEDICINE

## 2018-07-29 PROCEDURE — 97530 THERAPEUTIC ACTIVITIES: CPT

## 2018-07-29 PROCEDURE — 85018 HEMOGLOBIN: CPT | Performed by: INTERNAL MEDICINE

## 2018-07-29 PROCEDURE — 36415 COLL VENOUS BLD VENIPUNCTURE: CPT | Performed by: INTERNAL MEDICINE

## 2018-07-29 PROCEDURE — 80048 BASIC METABOLIC PNL TOTAL CA: CPT | Performed by: INTERNAL MEDICINE

## 2018-07-29 PROCEDURE — 82330 ASSAY OF CALCIUM: CPT | Performed by: INTERNAL MEDICINE

## 2018-07-29 PROCEDURE — 93971 EXTREMITY STUDY: CPT

## 2018-07-29 PROCEDURE — 87040 BLOOD CULTURE FOR BACTERIA: CPT | Performed by: HOSPITALIST

## 2018-07-29 PROCEDURE — 87491 CHLMYD TRACH DNA AMP PROBE: CPT | Performed by: HOSPITALIST

## 2018-07-29 PROCEDURE — 74011000258 HC RX REV CODE- 258: Performed by: HOSPITALIST

## 2018-07-29 PROCEDURE — 65270000029 HC RM PRIVATE

## 2018-07-29 PROCEDURE — 80202 ASSAY OF VANCOMYCIN: CPT | Performed by: INTERNAL MEDICINE

## 2018-07-29 RX ORDER — DOCUSATE SODIUM 100 MG/1
100 CAPSULE, LIQUID FILLED ORAL 3 TIMES DAILY
Status: DISCONTINUED | OUTPATIENT
Start: 2018-07-29 | End: 2018-07-31 | Stop reason: HOSPADM

## 2018-07-29 RX ORDER — CHOLECALCIFEROL (VITAMIN D3) 125 MCG
5 CAPSULE ORAL
Status: DISCONTINUED | OUTPATIENT
Start: 2018-07-29 | End: 2018-07-31 | Stop reason: HOSPADM

## 2018-07-29 RX ADMIN — DOCUSATE SODIUM 100 MG: 100 CAPSULE, LIQUID FILLED ORAL at 09:50

## 2018-07-29 RX ADMIN — LEVOFLOXACIN 500 MG: 5 INJECTION, SOLUTION INTRAVENOUS at 09:50

## 2018-07-29 RX ADMIN — ACETAMINOPHEN 650 MG: 325 SOLUTION ORAL at 22:58

## 2018-07-29 RX ADMIN — Medication 5 MG: at 22:58

## 2018-07-29 RX ADMIN — IRON SUCROSE 100 MG: 20 INJECTION, SOLUTION INTRAVENOUS at 13:10

## 2018-07-29 RX ADMIN — SODIUM CHLORIDE 750 MG: 900 INJECTION, SOLUTION INTRAVENOUS at 06:08

## 2018-07-29 RX ADMIN — LORAZEPAM 1 MG: 2 INJECTION INTRAMUSCULAR; INTRAVENOUS at 22:58

## 2018-07-29 RX ADMIN — SODIUM CHLORIDE 40 MG: 9 INJECTION INTRAMUSCULAR; INTRAVENOUS; SUBCUTANEOUS at 09:50

## 2018-07-29 RX ADMIN — DOCUSATE SODIUM 100 MG: 100 CAPSULE, LIQUID FILLED ORAL at 22:58

## 2018-07-29 RX ADMIN — SODIUM CHLORIDE 750 MG: 900 INJECTION, SOLUTION INTRAVENOUS at 18:36

## 2018-07-29 NOTE — PROGRESS NOTES
Lab called for notification of gram positive cocci growing in aerobic blood culture. Dr. Bethany Cueto paged for notification. Awaiting call back.

## 2018-07-29 NOTE — PROGRESS NOTES
Chart accessed as CM on call. Pt admitted for rectal bleed. Pt was transferred from ICU to med/surg. Received page from unit secretary on 3S that consult placed for rehab placement. Unclear if referring to rehab for substance abuse or inpatient PT. CM will meet with patient later today to assess. 1814 Sabrina Rogers Met with pt at bedside. Pt states she will get her social security check on 8/1 and will go to a hotel at that time. Pt had no mention of needing rehab. Pt states her living situation has been unstable. Pt gave CM verbal permission to speak with her mother Cally Horner if needed. She states her mother will drive her home at discharge. Pt states she is on disability. CM will cont to follow.

## 2018-07-29 NOTE — PROGRESS NOTES
Hospitalist Progress Note-critical care note Patient: Jazlyn Sy MRN: [id number]  CSN: [id number] YOB: 1975  Age: 37 y.o. Sex: female DOA: 7/25/2018 LOS:  LOS: 4 days Chief complaint: gi bleeding, smoker , substance abuse , transient hypotension , anemia ,cellulitis. Assessment/Plan Hospital Problems  Date Reviewed: 11/23/2013 Codes Class Noted POA Cellulitis of right arm ICD-10-CM: L03.113 ICD-9-CM: 682.3  7/29/2018 Unknown Hypomagnesemia ICD-10-CM: M00.70 
ICD-9-CM: 275.2  7/27/2018 Unknown Hypophosphatemia ICD-10-CM: E83.39 
ICD-9-CM: 275.3  7/27/2018 Unknown Substance abuse ICD-10-CM: F19.10 ICD-9-CM: 305.90  7/26/2018 Unknown Smoker ICD-10-CM: W57.745 ICD-9-CM: 305.1  7/26/2018 Unknown Anemia ICD-10-CM: D64.9 ICD-9-CM: 285.9  7/25/2018 Unknown * (Principal)Rectal bleed ICD-10-CM: K62.5 ICD-9-CM: 569.3  7/25/2018 Unknown Transient hypotension ICD-10-CM: I95.9 ICD-9-CM: 796.3  7/25/2018 Unknown Adjustment disorder with mixed anxiety and depressed mood ICD-10-CM: F43.23 
ICD-9-CM: 309.28  4/20/2013 Yes Rectal bleeding No bleeding Colonoscopy done There is a moderately deep triangular ulceration in the distal rectum sitting on a hemorrhoid anterior wall  
cta :Colonic wall thickening of the transverse colon which may represent 
underdistention versus colitis either infectious or inflammatory  
Multiple low density lesions in the liver suggesting probable cysts or Hemangiomas. Anemia due to acute blood loss H/h stable , will continue iron infusion Received two units prbc, will continue h/h, so far stable, So far h/h stable Substance abuse Prn ativan if case withdrawal. uds , thc/cocaine , denies any iv drug use Smoker Nicotine patch Depression No hi/si Hypotension Resolved. Continue iv infusion Central line placed per intensive ?bacteremia -denies any iv drug use 1/4 positive , will waiting for the final results On vanc and levaquin Insomnia Melatonin at night Weakness Pt/ot Vaginal irritation Will screen for std Cellulitis of rt arm 
 pvl and on iv abx Disposition :1-2 days Subjective: feel good, arm pain , percocet helping my sleep. Do not want any body else except MOM visit her Nurse: want some rehab Review of systems: 
 
General: No fevers or chills. tired, 
Cardiovascular: No chest pain or pressure. No palpitations. Pulmonary: No shortness of breath. Gastrointestinal: No nausea, vomiting. Msk: arm pain Vital signs/Intake and Output: 
Visit Vitals  /62 (BP 1 Location: Right arm, BP Patient Position: At rest)  Pulse 78  Temp 98.2 °F (36.8 °C)  Resp 18  Ht 5' 9\" (1.753 m)  Wt 49.9 kg (110 lb)  SpO2 100%  BMI 16.24 kg/m2 Current Shift:  07/29 0701 - 07/29 1900 In: 480 [P.O.:480] Out: - Last three shifts:  07/27 1901 - 07/29 0700 In: 720 [P.O.:720] Out: 8236 [Urine:2225] Physical Exam: 
General: WD, WN. Alert, cooperative, no acute distress   
HEENT: NC, Atraumatic. PERRLA, anicteric sclerae. Lungs: CTA Bilaterally. No Wheezing/Rhonchi/Rales. Heart:  Regular  rhythm,  No murmur, No Rubs, No Gallops Abdomen: Soft, Non distended, Non tender.  +Bowel sounds, Extremities: No c/c, mild swelling of fore arm/erythemia Psych:   Not anxious or agitated. Neurologic:  No acute neurological deficit. Labs: Results:  
   
Chemistry Recent Labs  
   07/29/18 
 0710  07/28/18 
 1100  07/28/18 
 0400 GLU  94   --   86  
NA  136   --   135* K  3.8  4.1  3.5 CL  104   --   105 CO2  25   --   27 BUN  4*   --   2*  
CREA  0.71   --   0.74 CA  7.9*   --   7.6* AGAP  7   --   3  
BUCR  6*   --   3* CBC w/Diff Recent Labs  
   07/29/18 
 0710  07/28/18 
 0400  07/27/18 
 1143 WBC   --   4.3*  3.8*  
RBC   --   2.74*  3.01* HGB  7.9*  7.4*  8.0*  
HCT  24.5*  22.5*  24.7*  
PLT   --   151  173 GRANS   --   64  71 LYMPH   --   28  20* EOS   --   1  0 Cardiac Enzymes No results for input(s): CPK, CKND1, VANDANA in the last 72 hours. No lab exists for component: Marie Christopher Coagulation No results for input(s): PTP, INR, APTT in the last 72 hours. No lab exists for component: INREXT, INREXT Lipid Panel Lab Results Component Value Date/Time Cholesterol, total 122 04/21/2013 06:00 AM  
 HDL Cholesterol 46 04/21/2013 06:00 AM  
 LDL, calculated 58.6 04/21/2013 06:00 AM  
 VLDL, calculated 17.4 04/21/2013 06:00 AM  
 Triglyceride 87 04/21/2013 06:00 AM  
 CHOL/HDL Ratio 2.7 04/21/2013 06:00 AM  
  
BNP No results for input(s): BNPP in the last 72 hours. Liver Enzymes No results for input(s): TP, ALB, TBIL, AP, SGOT, GPT in the last 72 hours. No lab exists for component: DBIL Thyroid Studies Lab Results Component Value Date/Time TSH 1.12 02/20/2015 07:03 AM  
    
Procedures/imaging: see electronic medical records for all procedures/Xrays and details which were not copied into this note but were reviewed prior to creation of Plan Halley Reyes MD

## 2018-07-29 NOTE — PROGRESS NOTES
Vancomycin - Pharmacy to Dose ( Gm + cocci infection ) Patient currently on Vancomycin 750 mg IV q12hrs. Trough Level 3.6 today at 17:32 ==> sub therapeutic CrCl 78.8 ml/min. Will increase dosing to Vancomycin 1000 mg IV q12hrs ( ~20 mg /kg ) Re-check level after 5-6 doses. Pharmacy will continue to follow and adjust. 
Cristi Swain   [phone]

## 2018-07-29 NOTE — PROGRESS NOTES
Problem: Mobility Impaired (Adult and Pediatric) Goal: *Acute Goals and Plan of Care (Insert Text) Physical Therapy Goals Initiated 7/29/2018 and to be accomplished within 7 day(s) 1. Patient will move from supine to sit and sit to supine  in bed with modified independence. 2.  Patient will transfer from bed to chair and chair to bed with modified independence using the least restrictive device. 3.  Patient will perform sit to stand with supervision/set-up. 4.  Patient will ambulate with supervision/set-up for 300 feet with the least restrictive device. 5.  Patient will ascend/descend 5 stairs with 0 handrail(s) with modified independence. Outcome: Progressing Towards Goal 
physical Therapy EVALUATION Patient: Idalia Troncoso (31 y.o. female) Date: 7/29/2018 Primary Diagnosis: Rectal bleed Anemia Transient hypotension GI BLEED Procedure(s) (LRB): 
COLONOSCOPY (N/A) 2 Days Post-Op Precautions:   Fall ASSESSMENT : 
Based on the objective data described below, the patient presents with lower extremity weakness, decreased gait quality and endurance, impaired bed mobility and transfers, decreased LE ROM/flexibility, and overall limitations in functional mobility d/t above Dx. Pt performed supine to sit with SBA, sit to stand with CGA/Pat. Patient ambulated 40 feet with GB applied, Pat. Pt tolerated session fairly as evidenced by increased SOB and reported light headedness. Unable to obtain Sp02 and HR via pulse ox. Pt willing to participate after mod encouragement. She states she is unsure where she will live after DC, stating \"I'm homeless right now. \" Pt states she will try a motel \"if (she) get out after the 1st.\" Pt stating ex roommate is a \"crackhead. \" Pt willing to amb in room with GB and no AD. She demo'd increased trunk sway and demo'd LOB during gait req min/modA from PT to maintain upright status. Pt demo's unsteadiness on her feet. She may benefit from AD next visit.  She states 20/10 pain in ROSELYN UE and \"light headedness. \" She lacks safety awareness currently. After returning to bed, pt req to use restroom. She was able to amb again with GB and Pat and no LOB but still demo'd unsteadiness on her feet. Pt returned to bed, supine with SBA, all needs met, NAD, call bell within reach. Patient would benefit from skilled inpatient physical therapy to address deficits, progress as tolerated to achieve long term goals and allow safe discharge. Patient will benefit from skilled intervention to address the above impairments. Patients rehabilitation potential is considered to be Good Factors which may influence rehabilitation potential include:  
[]         None noted 
[]         Mental ability/status []         Medical condition 
[x]         Home/family situation and support systems 
[x]         Safety awareness 
[]         Pain tolerance/management 
[]         Other: PLAN : 
Recommendations and Planned Interventions: 
[x]           Bed Mobility Training             [x]    Neuromuscular Re-Education 
[x]           Transfer Training                   [x]    Orthotic/Prosthetic Training 
[x]           Gait Training                          [x]    Modalities [x]           Therapeutic Exercises          []    Edema Management/Control 
[x]           Therapeutic Activities            [x]    Patient and Family Training/Education 
[]           Other (comment): Frequency/Duration: Patient will be followed by physical therapy 1-2 times per day/4-7 days per week to address goals. Discharge Recommendations: Home Health Further Equipment Recommendations for Discharge: straight cane SUBJECTIVE:  
Patient stated I'm tired.  OBJECTIVE DATA SUMMARY:  
 
Past Medical History:  
Diagnosis Date  History of appendectomy  SAH (subarachnoid hemorrhage) ([facility] [address]  Substance abuse Past Surgical History:  
Procedure Laterality Date  HX APPENDECTOMY  HX  SECTION    
 HX TRACHEOSTOMY Barriers to Learning/Limitations: yes;  financial 
Compensate with: visual, verbal, tactile, kinesthetic cues/model Prior Level of Function/Home Situation: Pt states Ind with ADL's and amb without AD Home Situation Home Environment: Other (comment) (See assessment for details) Critical Behavior: 
Neurologic State: Alert Orientation Level: Oriented X4 Psychosocial 
Patient Behaviors: Calm; Cooperative Purposeful Interaction: Yes Pt Identified Daily Priority: Clinical issues (comment); Communication issues (comment) Caritas Process: Establish trust 
Caring Interventions: Reassure; Therapeutic modalities Reassure: Informing; Acceptance;Quiet presence;Caring rounds Therapeutic Modalities: Intentional therapeutic touch Strength: LE strength is generally decreased, functional 
Tone & Sensation:  
Range Of Motion: 
AROM: Generally decreased, functional 
PROM: Generally decreased, functional 
 Functional Mobility: 
Bed Mobility: 
Rolling: Stand-by assistance Supine to Sit: Stand-by assistance Sit to Supine: Stand-by assistance Scooting: Stand-by assistance Transfers: 
Sit to Stand: Stand-by assistance;Contact guard assistance Stand to Sit: Stand-by assistance Balance:  
Sitting: With support Standing: Impaired Standing - Static: Good Standing - Dynamic : Fair Ambulation/Gait Training: 
Distance (ft): 40 Feet (ft) Assistive Device: Gait belt Ambulation - Level of Assistance: Minimal assistance Gait Description (WDL): Exceptions to [facility] Gait Abnormalities: Path deviations;Decreased step clearance Base of Support: Narrowed Stance: Left increased Speed/Sia: Fluctuations Pain: Pt states 20/10 pain in ROSELYN UE (distally) Activity Tolerance:  
fair Please refer to the flowsheet for vital signs taken during this treatment.  
After treatment:  
[]         Patient left in no apparent distress sitting up in chair 
[x]         Patient left in no apparent distress in bed 
[x] Call bell left within reach [x]         Nursing notified 
[]         Caregiver present 
[]         Bed alarm activated COMMUNICATION/EDUCATION:  
[]         Fall prevention education was provided and the patient/caregiver indicated understanding. [x]         Patient/family have participated as able in goal setting and plan of care. [x]         Patient/family agree to work toward stated goals and plan of care. []         Patient understands intent and goals of therapy, but is neutral about his/her participation. []         Patient is unable to participate in goal setting and plan of care. Mobility [id number] Current  CK= 40-59%   Goal  CJ= 20-39%. The severity rating is based on the Level of Assistance required for Functional Mobility and ADLs. Eval Complexity: History: LOW Complexity : Zero comorbidities / personal factors that will impact the outcome / POCExam:LOW Complexity : 1-2 Standardized tests and measures addressing body structure, function, activity limitation and / or participation in recreation  Presentation: LOW Complexity : Stable, uncomplicated  Clinical Decision Making:Low Complexity d/t PLOF  Overall Complexity:LOW Thank you for this referral. 
[facility] Time Calculation: 24 mins

## 2018-07-29 NOTE — PROGRESS NOTES
Gastrointestinal Progress Note Patient Name: Ziyad Cartagena Today's Date: 7/29/2018 Admit Date: 7/25/2018 Subjective:  
 
Diet: Patient is on Regular and is tolerating. Nausea is not present. Vomiting is not present. Pain: Patient does not complain of abdominal pain. Rather right fore arm  pain is located at the IV site where there is a red edema suggestive of IV site induced thrombophlebitis Bowel Movements: Normal small difficult Bleeding:  None Current Facility-Administered Medications Medication Dose Route Frequency  melatonin tablet 5 mg  5 mg Oral QHS  Vancomycin Trough due 7/29/18 at 17:30 (30 minute prior to 18:00 dose)  1 Each Other ONCE  
 vancomycin (VANCOCIN) 750 mg in 0.9% sodium chloride (MBP/ADV) 250 mL ADV  750 mg IntraVENous Q12H  Vancomycin- pharmacy to dose  1 Each Other Rx Dosing/Monitoring  iron sucrose (VENOFER) 100 mg in 0.9% sodium chloride 100 mL IVPB  100 mg IntraVENous Q24H  
 levoFLOXacin (LEVAQUIN) 500 mg in D5W IVPB  500 mg IntraVENous Q24H  
 metoclopramide HCl (REGLAN) injection 5 mg  5 mg IntraVENous Q6H PRN  
 LORazepam (ATIVAN) injection 1 mg  1 mg IntraVENous Q4H PRN  
 nicotine (NICODERM CQ) 14 mg/24 hr patch 1 Patch  1 Patch TransDERmal DAILY  pantoprazole (PROTONIX) 40 mg in sodium chloride 0.9% 10 mL injection  40 mg IntraVENous DAILY  0.9% sodium chloride infusion 250 mL  250 mL IntraVENous PRN  
 acetaminophen (TYLENOL) solution 650 mg  650 mg Oral Q4H PRN  
 diphenhydrAMINE (BENADRYL) capsule 25 mg  25 mg Oral Q6H PRN Objective:  
 
Visit Vitals  /62 (BP 1 Location: Right arm, BP Patient Position: At rest)  Pulse 78  Temp 98.2 °F (36.8 °C)  Resp 18  Ht 5' 9\" (1.753 m)  Wt 49.9 kg (110 lb)  SpO2 100%  BMI 16.24 kg/m2  
 
 
07/27 1901 - 07/29 0700 In: 720 [P.O.:720] Out: 9757 [Urine:2225] General appearance: alert, cooperative, mild distress, appears stated age, pale Abdomen: soft, non-tender. Bowel sounds normal. No masses,  no organomegaly Extremities: localized tender edema at previous iv site right upper forearm. Data Review: 
 
Labs: Results:  
   
Chemistry Recent Labs  
   07/29/18 
 0710  07/28/18 
 1100  07/28/18 
 0400 GLU  94   --   86  
NA  136   --   135* K  3.8  4.1  3.5 CL  104   --   105 CO2  25   --   27 BUN  4*   --   2*  
CREA  0.71   --   0.74 CA  7.9*   --   7.6* AGAP  7   --   3  
BUCR  6*   --   3* CBC w/Diff Recent Labs  
   07/29/18 
 0710  07/28/18 
 0400  07/27/18 
 1143 WBC   --   4.3*  3.8*  
RBC   --   2.74*  3.01* HGB  7.9*  7.4*  8.0*  
HCT  24.5*  22.5*  24.7*  
PLT   --   151  173 GRANS   --   64  71 LYMPH   --   28  20* EOS   --   1  0 Coagulation No results for input(s): PTP, INR, APTT in the last 72 hours. No lab exists for component: INREXT, INREXT Liver Enzymes No results for input(s): TP, ALB, TBIL, AP, SGOT, GPT in the last 72 hours. No lab exists for component: DBIL Assessment:  
 
Principal Problem: 
  Rectal bleed (7/25/2018) Active Problems: 
  Adjustment disorder with mixed anxiety and depressed mood (4/20/2013) Anemia (7/25/2018) Transient hypotension (7/25/2018) Substance abuse (7/26/2018) Smoker (7/26/2018) Hypomagnesemia (7/27/2018) Hypophosphatemia (7/27/2018) Cellulitis of right arm (7/29/2018) Vaginal irritation (7/29/2018) Plan:  
 
Significant Rectal bleeding started the morning of July 25, caused by a small distal rectal traumatic laceration 12 mm. no bleeding for 3 days. Plan can be discharged on Colace 100 mg tid  For at least 2 weeks to keep stools soft. In case of recurrence of severe bleeding. Acute blood loss anemia 7.6 after 2 blood transfusions. She got iron infusion Multi-substance abuse New onset nephrotic syndrome with Albuminuria and hypoalbuminemia to 2 not sure about the cause may have to see a nephrologist? Repeat urine analysis not done yet? ? Normal renal function Bipolar disorder not on medication Smoker Blood culture staph species probably related to her arm cellulitis she is already on Vancomycine and Levaquin Cesar Edwards MD 
July 29, 2018

## 2018-07-29 NOTE — PROGRESS NOTES
Patient was angry and combative at start of shift, threatened to call  etc due to not receiving salad dressing and having the contact precautions on door. Contact was removed due to old MRSA infection in 2016. Salad dressing was retrieved from the kitchen. Patient calmed down overnight and expressed she was anxious due to family/personal issues and struggle with drug abuse. She wants to stay in the hospital for treatment and care. Stated she had a small amount of bloody bowel movement, an hour later she retracted that statement and stated [facility]. Had a long conversation about family situations and substance abuse, patient expressed interest in seeking outpatient or impatient rehab. Consult for case management put in as well as pastoral care. Bedside shift change report given to Kaley Patel Rn (oncoming nurse) by Shannon Schmid Rn (offgoing nurse). Report included the following information SBAR, Kardex, Procedure Summary, MAR and Recent Results.

## 2018-07-30 LAB
AMPHET UR QL SCN: NEGATIVE
ANION GAP SERPL CALC-SCNC: 5 MMOL/L (ref 3–18)
BACTERIA SPEC CULT: ABNORMAL
BARBITURATES UR QL SCN: NEGATIVE
BENZODIAZ UR QL: NEGATIVE
BUN SERPL-MCNC: 6 MG/DL (ref 7–18)
BUN/CREAT SERPL: 8 (ref 12–20)
CALCIUM SERPL-MCNC: 8.4 MG/DL (ref 8.5–10.1)
CANNABINOIDS UR QL SCN: NEGATIVE
CHLORIDE SERPL-SCNC: 104 MMOL/L (ref 100–108)
CO2 SERPL-SCNC: 29 MMOL/L (ref 21–32)
COCAINE UR QL SCN: NEGATIVE
CREAT SERPL-MCNC: 0.71 MG/DL (ref 0.6–1.3)
GLUCOSE SERPL-MCNC: 98 MG/DL (ref 74–99)
GRAM STN SPEC: ABNORMAL
GRAM STN SPEC: ABNORMAL
HCT VFR BLD AUTO: 27.1 % (ref 35–45)
HDSCOM,HDSCOM: NORMAL
HGB BLD-MCNC: 8.7 G/DL (ref 12–16)
MAGNESIUM SERPL-MCNC: 1.7 MG/DL (ref 1.6–2.6)
METHADONE UR QL: NEGATIVE
OPIATES UR QL: NEGATIVE
PCP UR QL: NEGATIVE
POTASSIUM SERPL-SCNC: 4.2 MMOL/L (ref 3.5–5.5)
SERVICE CMNT-IMP: ABNORMAL
SODIUM SERPL-SCNC: 138 MMOL/L (ref 136–145)

## 2018-07-30 PROCEDURE — 74011000258 HC RX REV CODE- 258: Performed by: HOSPITALIST

## 2018-07-30 PROCEDURE — 74011250637 HC RX REV CODE- 250/637: Performed by: HOSPITALIST

## 2018-07-30 PROCEDURE — 85018 HEMOGLOBIN: CPT | Performed by: INTERNAL MEDICINE

## 2018-07-30 PROCEDURE — 80048 BASIC METABOLIC PNL TOTAL CA: CPT | Performed by: INTERNAL MEDICINE

## 2018-07-30 PROCEDURE — 74011000250 HC RX REV CODE- 250: Performed by: INTERNAL MEDICINE

## 2018-07-30 PROCEDURE — 97116 GAIT TRAINING THERAPY: CPT

## 2018-07-30 PROCEDURE — 74011250637 HC RX REV CODE- 250/637: Performed by: INTERNAL MEDICINE

## 2018-07-30 PROCEDURE — C9113 INJ PANTOPRAZOLE SODIUM, VIA: HCPCS | Performed by: INTERNAL MEDICINE

## 2018-07-30 PROCEDURE — 74011250636 HC RX REV CODE- 250/636: Performed by: INTERNAL MEDICINE

## 2018-07-30 PROCEDURE — 36415 COLL VENOUS BLD VENIPUNCTURE: CPT | Performed by: INTERNAL MEDICINE

## 2018-07-30 PROCEDURE — 97530 THERAPEUTIC ACTIVITIES: CPT

## 2018-07-30 PROCEDURE — 74011250636 HC RX REV CODE- 250/636: Performed by: HOSPITALIST

## 2018-07-30 PROCEDURE — 65270000029 HC RM PRIVATE

## 2018-07-30 PROCEDURE — 83735 ASSAY OF MAGNESIUM: CPT | Performed by: INTERNAL MEDICINE

## 2018-07-30 PROCEDURE — 80307 DRUG TEST PRSMV CHEM ANLYZR: CPT | Performed by: HOSPITALIST

## 2018-07-30 RX ADMIN — IRON SUCROSE 100 MG: 20 INJECTION, SOLUTION INTRAVENOUS at 14:16

## 2018-07-30 RX ADMIN — DOCUSATE SODIUM 100 MG: 100 CAPSULE, LIQUID FILLED ORAL at 22:05

## 2018-07-30 RX ADMIN — LEVOFLOXACIN 500 MG: 5 INJECTION, SOLUTION INTRAVENOUS at 11:45

## 2018-07-30 RX ADMIN — ACETAMINOPHEN 650 MG: 325 SOLUTION ORAL at 14:16

## 2018-07-30 RX ADMIN — Medication 5 MG: at 22:05

## 2018-07-30 RX ADMIN — DOCUSATE SODIUM 100 MG: 100 CAPSULE, LIQUID FILLED ORAL at 14:16

## 2018-07-30 RX ADMIN — SODIUM CHLORIDE 250 ML: 900 INJECTION, SOLUTION INTRAVENOUS at 10:19

## 2018-07-30 RX ADMIN — DOCUSATE SODIUM 100 MG: 100 CAPSULE, LIQUID FILLED ORAL at 10:10

## 2018-07-30 RX ADMIN — SODIUM CHLORIDE 40 MG: 9 INJECTION INTRAMUSCULAR; INTRAVENOUS; SUBCUTANEOUS at 10:11

## 2018-07-30 RX ADMIN — SODIUM CHLORIDE 1000 MG: 900 INJECTION, SOLUTION INTRAVENOUS at 17:20

## 2018-07-30 NOTE — PROGRESS NOTES
NUTRITION FOLLOW-UP 
 
RECOMMENDATIONS/PLAN:  
- Continue w/ POC Monitor labs/lytes, PO intakes, skin integrity, wt, fluid status, BM 
 
NUTRITION ASSESSMENT:  
Client Update: 37 yrs old Female with gi bleeding, transient hypotension, anemia, cellulitis. Pt received two units of PRBCs Per MD colonoscopy showed moderately deep triangular ulceration in the distal rectum sitting on a hemorrhoid anterior wall FOOD/NUTRITION INTAKE Diet Order:  Regular Supplements: Ensure Clear TID Food Allergies: NKFA/ Average PO Intake:     
Patient Vitals for the past 100 hrs: 
 % Diet Eaten  
07/29/18 1200 100 % 07/29/18 0843 10 % 07/28/18 1039 95 %  
07/27/18 1545 0 %  
07/26/18 1915 100 % 07/26/18 1512 100 % 07/26/18 0800 75 % Pertinent Medications:  [x] Reviewed; colace, melatonin, protonix Electrolyte Replacement Protocol: []K []Mg []PO4 Insulin:  []SSI  []Pre-meal   []Basal    []Drip  []None Cultural/Rastafarian Food Preferences: None Identified BIOCHEMICAL DATA & MEDICAL TESTS Pertinent Labs:  [x] Reviewed; ANTHROPOMETRICS Height: 5' 9\" (175.3 cm)       Weight: 51.5 kg (113 lb 8 oz) BMI: 16.8 kg/m^2 underweight (Less than or = to 18.5% BMI) Adm Weight: 110 lbs                Weight change: +3lbs Adjusted Body Weight: n/a NUTRITION-FOCUSED PHYSICAL ASSESSMENT Skin: No PU     
GI: +BM 7/28/18 NUTRITION PRESCRIPTION Calories: 8881-3957 kcal/day based on 30-35kcal/kg Protein: 60-75 g/day based on 1.2-1.5 g/kg CHO: 188-219 g/day based on 50% of total energy Fluid: 1146-5520 ml/day based on 1 kcal/ml NUTRITION DIAGNOSES:  
1. Predicted suboptimal energy intake related to inadequate oral intake as evidence by BMI 16.2 
   
 
NUTRITION INTERVENTIONS:  
INTERVENTIONS:        GOALS: 
1. Other:continue w/ POC 1. Continue to encourage PO intake >50% at most meals by next review 3 days LEARNING NEEDS (Diet, Supplementation, Food/Nutrient-Drug Interaction): 
 [x] None Identified   [] Education provided/documented      Identified and patient: [] Declined   [] Was not appropriate/indicated NUTRITION MONITORING /EVALUATION:  
Follow PO intake Monitor wt Monitor renal labs, electrolytes, fluid status Monitor for additional supplement needs Previous Recommendations Implemented: yes Previous Goals Met:  yes -diet was adv  
   
[] Participated in Interdisciplinary Rounds   
[x] [address] [place] [address] Reviewed DISCHARGE NUTRITION RECOMMENDATIONS ADDRESSED:  
  [x] To be determined closer to discharge NUTRITION RISK:           [x] At risk                        [] Not currently at risk Will follow-up per policy. Pamella Potter 9

## 2018-07-30 NOTE — PROGRESS NOTES
Hospitalist Progress Note-critical care note Patient: Dalila Alan MRN: [id number]  CSN: [id number] YOB: 1975  Age: 37 y.o. Sex: female DOA: 7/25/2018 LOS:  LOS: 5 days Chief complaint: gi bleeding, smoker , substance abuse , transient hypotension , anemia ,cellulitis. Assessment/Plan Hospital Problems  Date Reviewed: 11/23/2013 Codes Class Noted POA Cellulitis of right arm ICD-10-CM: L03.113 ICD-9-CM: 682.3  7/29/2018 Unknown Vaginal irritation ICD-10-CM: N89.8 ICD-9-CM: 623.9  7/29/2018 Unknown Hypomagnesemia ICD-10-CM: H89.24 
ICD-9-CM: 275.2  7/27/2018 Unknown Hypophosphatemia ICD-10-CM: E83.39 
ICD-9-CM: 275.3  7/27/2018 Unknown Substance abuse ICD-10-CM: F19.10 ICD-9-CM: 305.90  7/26/2018 Unknown Smoker ICD-10-CM: Z11.108 ICD-9-CM: 305.1  7/26/2018 Unknown Anemia ICD-10-CM: D64.9 ICD-9-CM: 285.9  7/25/2018 Unknown * (Principal)Rectal bleed ICD-10-CM: K62.5 ICD-9-CM: 569.3  7/25/2018 Unknown Transient hypotension ICD-10-CM: I95.9 ICD-9-CM: 796.3  7/25/2018 Unknown Adjustment disorder with mixed anxiety and depressed mood ICD-10-CM: F43.23 
ICD-9-CM: 309.28  4/20/2013 Yes Rectal bleeding No bleeding. Will continue monitoring Colonoscopy done There is a moderately deep triangular ulceration in the distal rectum sitting on a hemorrhoid anterior wall  
cta :Colonic wall thickening of the transverse colon which may represent 
underdistention versus colitis either infectious or inflammatory  
Multiple low density lesions in the liver suggesting probable cysts or Hemangiomas. Anemia due to acute blood loss H/h stable , receiving iron infusion-will be last day for infusion Received two units prbc, will continue h/h, so far stable, So far h/h stable Substance abuse Prn ativan if case withdrawal. uds , thc/cocaine , denies any iv drug use .  Will have daily uds Smoker Nicotine patch Depression No hi/si Hypotension Resolved. Continue iv infusion Central line placed per intensive  
 
bacteremia -denies any iv drug use 2/4 positive , will waiting for the final results -will continue vanc and levaquin On vanc and levaquin Insomnia Melatonin at night Weakness Pt/ot Vaginal irritation Will screen for std -results pending Cellulitis of rt arm 
 pvl negative  and on iv abx Disposition :tbd Subjective: I want to have shower. Nurse: loss iv access-refusing iv access placement Explained pt about need iv access , she agrees to have somebody to try. bp was low for one time, need to recheck manually-discussed with RN. Review of systems: 
 
General: No fevers or chills. tired, 
Cardiovascular: No chest pain or pressure. No palpitations. Pulmonary: No shortness of breath. Gastrointestinal: No nausea, vomiting. Vital signs/Intake and Output: 
Visit Vitals  BP (!) 84/42 (BP 1 Location: Left arm, BP Patient Position: At rest)  Pulse 71  Temp 98.2 °F (36.8 °C)  Resp 17  Ht 5' 9\" (1.753 m)  Wt 51.5 kg (113 lb 8 oz)  SpO2 92%  BMI 16.76 kg/m2 Current Shift:    
Last three shifts:  07/28 1901 - 07/30 0700 In: 720 [P.O.:720] Out: - Physical Exam: 
General: WD, WN. Alert, cooperative, no acute distress   
HEENT: NC, Atraumatic. PERRLA, anicteric sclerae. Lungs: CTA Bilaterally. No Wheezing/Rhonchi/Rales. Heart:  Regular  rhythm,  No murmur, No Rubs, No Gallops Abdomen: Soft, Non distended, Non tender.  +Bowel sounds, Extremities: No c/c, mild swelling of fore arm/erythemia -improving Psych:   Not anxious or agitated. Neurologic:  No acute neurological deficit. Labs: Results:  
   
Chemistry Recent Labs  
   07/30/18 
 0707  07/29/18 
 0710  07/28/18 
 1100  07/28/18 
 0400 GLU  98  94   --   86  
NA  138  136   --   135* K  4.2  3.8  4.1  3.5 CL  104 104   --   105 CO2  29  25   --   27 BUN  6*  4*   --   2*  
CREA  0.71  0.71   --   0.74 CA  8.4*  7.9*   --   7.6* AGAP  5  7   --   3  
BUCR  8*  6*   --   3* CBC w/Diff Recent Labs  
   07/30/18 
 0707  07/29/18 
 0710  07/28/18 
 0400  07/27/18 
 1143 WBC   --    --   4.3*  3.8*  
RBC   --    --   2.74*  3.01* HGB  8.7*  7.9*  7.4*  8.0*  
HCT  27.1*  24.5*  22.5*  24.7*  
PLT   --    --   151  173 GRANS   --    --   64  71 LYMPH   --    --   28  20* EOS   --    --   1  0 Cardiac Enzymes No results for input(s): CPK, CKND1, VANDANA in the last 72 hours. No lab exists for component: Jhony Draper Coagulation No results for input(s): PTP, INR, APTT in the last 72 hours. No lab exists for component: INREXT, INREXT Lipid Panel Lab Results Component Value Date/Time Cholesterol, total 122 04/21/2013 06:00 AM  
 HDL Cholesterol 46 04/21/2013 06:00 AM  
 LDL, calculated 58.6 04/21/2013 06:00 AM  
 VLDL, calculated 17.4 04/21/2013 06:00 AM  
 Triglyceride 87 04/21/2013 06:00 AM  
 CHOL/HDL Ratio 2.7 04/21/2013 06:00 AM  
  
BNP No results for input(s): BNPP in the last 72 hours. Liver Enzymes No results for input(s): TP, ALB, TBIL, AP, SGOT, GPT in the last 72 hours. No lab exists for component: DBIL Thyroid Studies Lab Results Component Value Date/Time TSH 1.12 02/20/2015 07:03 AM  
    
Procedures/imaging: see electronic medical records for all procedures/Xrays and details which were not copied into this note but were reviewed prior to creation of Plan Mp Melissa MD

## 2018-07-30 NOTE — PROGRESS NOTES
Occupational Therapy Evaluation/Screening: 
Services are not indicated at this time. Occupational Therapy evaluation orders received. The patients chart was reviewed. Evaluation initiated with patient observed in bathroom performing all toileting and hand hygiene, including clothing management and able to walk in room w/o LOB. RN/Genny in room and confirms patient is performing ADLs. Patient is not appropriate for a skilled therapy evaluation at this time. Will sign off. Thank you.  
Matilde Hood, OTR/L

## 2018-07-30 NOTE — PROGRESS NOTES
Bedside and Verbal shift change report given to [address] [place] [address] (oncoming nurse) by Micah Villagran RN (offgoing nurse). Report included the following information SBAR and Kardex. Patient Vitals for the past 12 hrs: 
 Temp Pulse Resp BP SpO2  
07/30/18 1540 97.4 °F (36.3 °C) 88 18 96/55 100 % 07/30/18 1148 97.6 °F (36.4 °C) 95 18 100/57 100 % 07/30/18 1040 - - - 118/70 -  
07/30/18 0754 98.2 °F (36.8 °C) 71 17 (!) 84/42 92 %

## 2018-07-30 NOTE — PROGRESS NOTES
Problem: Mobility Impaired (Adult and Pediatric) Goal: *Acute Goals and Plan of Care (Insert Text) Physical Therapy Goals Initiated 7/29/2018 and to be accomplished within 7 day(s) 1. Patient will move from supine to sit and sit to supine  in bed with modified independence. 2.  Patient will transfer from bed to chair and chair to bed with modified independence using the least restrictive device. 3.  Patient will perform sit to stand with supervision/set-up. 4.  Patient will ambulate with supervision/set-up for 300 feet with the least restrictive device. 5.  Patient will ascend/descend 5 stairs with 0 handrail(s) with modified independence. physical Therapy TREATMENT Patient: Karolyn Arango (25 y.o. female) Date: 7/30/2018 Diagnosis: Rectal bleed Anemia Transient hypotension GI BLEED Rectal bleed Procedure(s) (LRB): 
COLONOSCOPY (N/A) 3 Days Post-Op Precautions: Fall Chart, physical therapy assessment, plan of care and goals were reviewed. ASSESSMENT: 
Patient showing continued progress with mobility, and increasing ambulation distance with RW. Balance remains slightly unsteady and pt off focus. Pt left EOB with friend present. Both requesting nursing for lot of questions. Nursing aware. Cont POC. Progression toward goals: 
[]      Improving appropriately and progressing toward goals [x]      Improving slowly and progressing toward goals 
[]      Not making progress toward goals and plan of care will be adjusted PLAN: 
Patient continues to benefit from skilled intervention to address the above impairments. Continue treatment per established plan of care. Discharge Recommendations:  Home Health Further Equipment Recommendations for Discharge:  rolling walker SUBJECTIVE:  
Patient stated  I dont want to leave until the 1st  OBJECTIVE DATA SUMMARY:  
Critical Behavior: 
Neurologic State: Eyes open to voice, Sleeping Orientation Level: Oriented X4 Functional Mobility Training: 
Transfers: 
Sit to Stand: Stand-by assistance;Contact guard assistance Stand to Sit: Stand-by assistance Balance: 
Sitting: Intact Standing: Intact; With support Standing - Static: Fair Standing - Dynamic : Fair Ambulation/Gait Training: 
Distance (ft): 150 Feet (ft) Assistive Device: Walker, rolling;Gait belt Ambulation - Level of Assistance: Contact guard assistance Gait Abnormalities: Decreased step clearance; Path deviations Base of Support: Narrowed Speed/Sia: Slow Pain: 
Pain Scale 1: Numeric (0 - 10) Pain Intensity 1: 2 Pain Location 1: Arm Pain Orientation 1: Right Pain Description 1: Aching Pain Intervention(s) 1: Ice Activity Tolerance:  
Fair After treatment:  
[] Patient left in no apparent distress sitting up in chair 
[x] Patient left in no apparent distress in bed 
[x] Call bell left within reach 
[] Nursing notified 
[] Caregiver present 
[] Bed alarm activated Champ Murray PTA Time Calculation: 24 mins

## 2018-07-30 NOTE — PROGRESS NOTES
Assume pt care. Received bedside and verbal SBAR from Matthew Ambrocio. Pt in bed sleeping. Will respond to voice. Pt complains of pain in lower R arm from IV infiltration. Ice bag applied. Tylenol available. This nurse's name and phone number written on white board. Bed in lowest position. Call bell w/ in reach. Will continue to monitor. 0754 BP 84/42. Dr Patt Dorman placed order for BP to be rechecked manually. Pt PVT: Private. Pt would like for only mother to be given information. Only Mother can visit room. 56 Dr Patt Dorman placed order for urine culture. Pt voided in hat. Sample collected and placed in specimen jar. Pt sticker, time, date, and initials placed on jar. Orders printed and specimen taken to Lab. Levaquin scaned around 10:30. Pt requested to take shower before antibiotic is ran. This RN agreed. 1040 /70. Dr Patt Dorman informed verbally by this RN. 
 
[phone] Pt making excuses on why Levaquin admin should be further delayed. This RN stated it can not wait any longer. We are hooking up the IV now. The admin. Will only take an hour. Pt verbalized understanding. Levaquin running. 1146 Bedside and Verbal shift change report given to Conor Thao RN (oncoming nurse) by Gwen Campbell RN (offgoing nurse). Report included the following information SBAR, Kardex, ED Summary, OR Summary, Procedure Summary, Intake/Output, MAR, Accordion, Recent Results, Med Rec Status and Alarm Parameters .

## 2018-07-30 NOTE — PROGRESS NOTES
Problem: Mobility Impaired (Adult and Pediatric) Goal: *Acute Goals and Plan of Care (Insert Text) Physical Therapy Goals Initiated 7/29/2018 and to be accomplished within 7 day(s) 1. Patient will move from supine to sit and sit to supine  in bed with modified independence. 2.  Patient will transfer from bed to chair and chair to bed with modified independence using the least restrictive device. 3.  Patient will perform sit to stand with supervision/set-up. 4.  Patient will ambulate with supervision/set-up for 300 feet with the least restrictive device. 5.  Patient will ascend/descend 5 stairs with 0 handrail(s) with modified independence. Outcome: Progressing Towards Goal 
physical Therapy TREATMENT Patient: Rodrick Parmar (14 y.o. female) Date: 7/30/2018 Diagnosis: Rectal bleed Anemia Transient hypotension GI BLEED Rectal bleed Procedure(s) (LRB): 
COLONOSCOPY (N/A) 3 Days Post-Op Precautions: Fall Chart, physical therapy assessment, plan of care and goals were reviewed. ASSESSMENT: 
Pt amb without AD. Balance slightly unsteady and accelerated. VCs for safety. No LOB. Cont POC. Progression toward goals: 
[]      Improving appropriately and progressing toward goals [x]      Improving slowly and progressing toward goals 
[]      Not making progress toward goals and plan of care will be adjusted PLAN: 
Patient continues to benefit from skilled intervention to address the above impairments. Continue treatment per established plan of care. Discharge Recommendations:  Home Health Further Equipment Recommendations for Discharge:  N/A  
 
SUBJECTIVE:  
Patient stated  I'm leaving tomorrow if I get my check   OBJECTIVE DATA SUMMARY:  
Critical Behavior: 
Neurologic State: Eyes open to voice, Sleeping Orientation Level: Oriented X4 Functional Mobility Training: 
Transfers: 
Sit to Stand: Stand-by assistance Stand to Sit: Stand-by assistance Balance: 
Sitting: Intact Standing: Intact Standing - Static: Fair Standing - Dynamic : Fair Ambulation/Gait Training: 
Distance (ft): 350 Feet (ft) Assistive Device: Gait belt Ambulation - Level of Assistance: Contact guard assistance Gait Abnormalities: Decreased step clearance; Path deviations Base of Support: Narrowed Speed/Sia: Accelerated Pain: 
Pain Scale 1: Numeric (0 - 10) Pain Intensity 1: 2 Pain Location 1: Arm Pain Orientation 1: Right Pain Description 1: Aching Pain Intervention(s) 1: Ice Activity Tolerance:  
Fair After treatment:  
[] Patient left in no apparent distress sitting up in chair 
[x] Patient left in no apparent distress in bed 
[x] Call bell left within reach 
[] Nursing notified 
[x] Caregiver/ friend present 
[] Bed alarm activated Kvng Robbins PTA Time Calculation: 10 mins

## 2018-07-30 NOTE — PROGRESS NOTES
Bedside shift change report given to Noreen Colon Rn (oncoming nurse) by Robbie Chavira Rn (offgoing nurse). Report included the following information SBAR, Kardex, MAR and Recent Results. Jo Kaiser

## 2018-07-30 NOTE — PROGRESS NOTES
[phone] patient has a visitor in her room, her male landlord came to visit her. 1425 patient is on the phone talking to [facility], patient says that's her boyfriend. 1620 patient stated she can have visitors like friends and families.

## 2018-07-31 VITALS
TEMPERATURE: 98.1 F | BODY MASS INDEX: 18.06 KG/M2 | RESPIRATION RATE: 16 BRPM | SYSTOLIC BLOOD PRESSURE: 107 MMHG | OXYGEN SATURATION: 100 % | HEART RATE: 73 BPM | WEIGHT: 121.91 LBS | DIASTOLIC BLOOD PRESSURE: 68 MMHG | HEIGHT: 69 IN

## 2018-07-31 LAB
ANION GAP SERPL CALC-SCNC: 6 MMOL/L (ref 3–18)
BACTERIA SPEC CULT: ABNORMAL
BUN SERPL-MCNC: 13 MG/DL (ref 7–18)
BUN/CREAT SERPL: 19 (ref 12–20)
CALCIUM SERPL-MCNC: 8.2 MG/DL (ref 8.5–10.1)
CHLORIDE SERPL-SCNC: 105 MMOL/L (ref 100–108)
CO2 SERPL-SCNC: 25 MMOL/L (ref 21–32)
CREAT SERPL-MCNC: 0.69 MG/DL (ref 0.6–1.3)
GLUCOSE SERPL-MCNC: 91 MG/DL (ref 74–99)
GRAM STN SPEC: ABNORMAL
GRAM STN SPEC: ABNORMAL
MAGNESIUM SERPL-MCNC: 1.5 MG/DL (ref 1.6–2.6)
POTASSIUM SERPL-SCNC: 3.9 MMOL/L (ref 3.5–5.5)
SERVICE CMNT-IMP: ABNORMAL
SODIUM SERPL-SCNC: 136 MMOL/L (ref 136–145)

## 2018-07-31 PROCEDURE — 83735 ASSAY OF MAGNESIUM: CPT | Performed by: INTERNAL MEDICINE

## 2018-07-31 PROCEDURE — 74011250637 HC RX REV CODE- 250/637: Performed by: HOSPITALIST

## 2018-07-31 PROCEDURE — 74011250636 HC RX REV CODE- 250/636: Performed by: INTERNAL MEDICINE

## 2018-07-31 PROCEDURE — 77030020887 HC CRM SKN PROT DIMTH S&N -A

## 2018-07-31 PROCEDURE — C9113 INJ PANTOPRAZOLE SODIUM, VIA: HCPCS | Performed by: INTERNAL MEDICINE

## 2018-07-31 PROCEDURE — 36415 COLL VENOUS BLD VENIPUNCTURE: CPT | Performed by: INTERNAL MEDICINE

## 2018-07-31 PROCEDURE — 80048 BASIC METABOLIC PNL TOTAL CA: CPT | Performed by: INTERNAL MEDICINE

## 2018-07-31 PROCEDURE — 97116 GAIT TRAINING THERAPY: CPT

## 2018-07-31 PROCEDURE — 74011250637 HC RX REV CODE- 250/637: Performed by: INTERNAL MEDICINE

## 2018-07-31 RX ADMIN — LEVOFLOXACIN 500 MG: 5 INJECTION, SOLUTION INTRAVENOUS at 09:12

## 2018-07-31 RX ADMIN — SODIUM CHLORIDE 1000 MG: 900 INJECTION, SOLUTION INTRAVENOUS at 01:00

## 2018-07-31 RX ADMIN — DOCUSATE SODIUM 100 MG: 100 CAPSULE, LIQUID FILLED ORAL at 09:12

## 2018-07-31 RX ADMIN — SODIUM CHLORIDE 40 MG: 9 INJECTION INTRAMUSCULAR; INTRAVENOUS; SUBCUTANEOUS at 09:11
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
## 2018-07-31 NOTE — PROGRESS NOTES
Bedside and Verbal shift change report given to JUSTINE Sow RN (oncoming nurse) by Tacho Patel RN (offgoing nurse). Report included the following information SBAR and Kardex. Patient Vitals for the past 12 hrs: 
 Temp Pulse Resp BP SpO2  
07/31/18 1128 98.1 °F (36.7 °C) 73 16 107/68 100 % 07/31/18 0712 98.1 °F (36.7 °C) 78 18 91/57 100 %
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
## 2018-07-31 NOTE — PROGRESS NOTES
Problem: Mobility Impaired (Adult and Pediatric) Goal: *Acute Goals and Plan of Care (Insert Text) Physical Therapy Goals Initiated 7/29/2018 and to be accomplished within 7 day(s) 1. Patient will move from supine to sit and sit to supine  in bed with modified independence. 2.  Patient will transfer from bed to chair and chair to bed with modified independence using the least restrictive device. 3.  Patient will perform sit to stand with supervision/set-up. 4.  Patient will ambulate with supervision/set-up for 300 feet with the least restrictive device. 5.  Patient will ascend/descend 5 stairs with 0 handrail(s) with modified independence. Pt refused PT due to: 
[]  Nausea/vomiting 
[]  Eating 
[]  Pain 
[]  Pt lethargic [x]  Pt not in room upon arrival. Pt to discharge home today. DC packet at nurses station. Thank you.  
Madelyn Montano, PTA

## 2018-07-31 NOTE — PROGRESS NOTES
Problem: Falls - Risk of 
Goal: *Absence of Falls Document Durenda Dung Fall Risk and appropriate interventions in the flowsheet. Outcome: Progressing Towards Goal 
Fall Risk Interventions: 
Mobility Interventions: Patient to call before getting OOB Mentation Interventions: More frequent rounding Medication Interventions: Patient to call before getting OOB, Teach patient to arise slowly Elimination Interventions: Patient to call for help with toileting needs, Call light in reach Problem: Pressure Injury - Risk of 
Goal: *Prevention of pressure injury Document Mahesh Scale and appropriate interventions in the flowsheet. Outcome: Progressing Towards Goal 
Pressure Injury Interventions: 
Sensory Interventions: Assess changes in LOC Activity Interventions: Increase time out of bed Mobility Interventions: Pressure redistribution bed/mattress (bed type) Nutrition Interventions: Document food/fluid/supplement intake Friction and Shear Interventions: Apply protective barrier, creams and emollients

## 2018-07-31 NOTE — PROGRESS NOTES
Problem: Falls - Risk of 
Goal: *Absence of Falls Document Chano Hicks Fall Risk and appropriate interventions in the flowsheet. Outcome: Progressing Towards Goal 
Fall Risk Interventions: 
Mobility Interventions: Patient to call before getting OOB Mentation Interventions: Adequate sleep, hydration, pain control Medication Interventions: Patient to call before getting OOB, Teach patient to arise slowly Elimination Interventions: Patient to call for help with toileting needs, Call light in reach

## 2018-07-31 NOTE — DISCHARGE INSTRUCTIONS
Anemia: Care Instructions  Your Care Instructions    Anemia is a low level of red blood cells, which carry oxygen throughout your body. Many things can cause anemia. Lack of iron is one of the most common causes. Your body needs iron to make hemoglobin, a substance in red blood cells that carries oxygen from the lungs to your body's cells. Without enough iron, the body produces fewer and smaller red blood cells. As a result, your body's cells do not get enough oxygen, and you feel tired and weak. And you may have trouble concentrating. Bleeding is the most common cause of a lack of iron. You may have heavy menstrual bleeding or bleeding caused by conditions such as ulcers, hemorrhoids, or cancer. Regular use of aspirin or other anti-inflammatory medicines (such as ibuprofen) also can cause bleeding in some people. A lack of iron in your diet also can cause anemia, especially at times when the body needs more iron, such as during pregnancy, infancy, and the teen years. Your doctor may have prescribed iron pills. It may take several months of treatment for your iron levels to return to normal. Your doctor also may suggest that you eat foods that are rich in iron, such as meat and beans. There are many other causes of anemia. It is not always due to a lack of iron. Finding the specific cause of your anemia will help your doctor find the right treatment for you. Follow-up care is a key part of your treatment and safety. Be sure to make and go to all appointments, and call your doctor if you are having problems. It's also a good idea to know your test results and keep a list of the medicines you take. How can you care for yourself at home? · Take your medicines exactly as prescribed. Call your doctor if you think you are having a problem with your medicine. · If your doctor recommends iron pills, take them as directed:  ¨ Try to take the pills on an empty stomach about 1 hour before or 2 hours after meals. But you may need to take iron with food to avoid an upset stomach. ¨ Do not take antacids or drink milk or caffeine drinks (such as coffee, tea, or cola) at the same time or within 2 hours of the time that you take your iron. They can make it hard for your body to absorb the iron. ¨ Vitamin C (from food or supplements) helps your body absorb iron. Try taking iron pills with a glass of orange juice or some other food that is high in vitamin C, such as citrus fruits. ¨ Iron pills may cause stomach problems, such as heartburn, nausea, diarrhea, constipation, and cramps. Be sure to drink plenty of fluids, and include fruits, vegetables, and fiber in your diet each day. Iron pills often make your bowel movements dark or green. ¨ If you forget to take an iron pill, do not take a double dose of iron the next time you take a pill. ¨ Keep iron pills out of the reach of small children. An overdose of iron can be very dangerous. · Follow your doctor's advice about eating iron-rich foods. These include red meat, shellfish, poultry, eggs, beans, raisins, whole-grain bread, and leafy green vegetables. · Steam vegetables to help them keep their iron content. When should you call for help? Call 911 anytime you think you may need emergency care. For example, call if:    · You have symptoms of a heart attack. These may include:  ¨ Chest pain or pressure, or a strange feeling in the chest.  ¨ Sweating. ¨ Shortness of breath. ¨ Nausea or vomiting. ¨ Pain, pressure, or a strange feeling in the back, neck, jaw, or upper belly or in one or both shoulders or arms. ¨ Lightheadedness or sudden weakness. ¨ A fast or irregular heartbeat. After you call 911, the  may tell you to chew 1 adult-strength or 2 to 4 low-dose aspirin. Wait for an ambulance.  Do not try to drive yourself.     · You passed out (lost consciousness).    Call your doctor now or seek immediate medical care if:    · You have new or increased shortness of breath.     · You are dizzy or lightheaded, or you feel like you may faint.     · Your fatigue and weakness continue or get worse.     · You have any abnormal bleeding, such as:  ¨ Nosebleeds. ¨ Vaginal bleeding that is different (heavier, more frequent, at a different time of the month) than what you are used to. ¨ Bloody or black stools, or rectal bleeding. ¨ Bloody or pink urine.    Watch closely for changes in your health, and be sure to contact your doctor if:    · You do not get better as expected. Where can you learn more? Go to http://darron-viry.info/. Enter R301 in the search box to learn more about \"Anemia: Care Instructions. \"  Current as of: October 9, 2017  Content Version: 11.7  © 8376-2474 [employer]. Care instructions adapted under license by [employer] (which disclaims liability or warranty for this information). If you have questions about a medical condition or this instruction, always ask your healthcare professional. [place] [address] any warranty or liability for your use of this information. DISCHARGE SUMMARY from Nurse    PATIENT INSTRUCTIONS:    After general anesthesia or intravenous sedation, for 24 hours or while taking prescription Narcotics:  · Limit your activities  · Do not drive and operate hazardous machinery  · Do not make important personal or business decisions  · Do  not drink alcoholic beverages  · If you have not urinated within 8 hours after discharge, please contact your surgeon on call.     Report the following to your surgeon:  · Excessive pain, swelling, redness or odor of or around the surgical area  · Temperature over 100.5  · Nausea and vomiting lasting longer than 4 hours or if unable to take medications  · Any signs of decreased circulation or nerve impairment to extremity: change in color, persistent  numbness, tingling, coldness or increase pain  · Any questions    What to do at Home:  Recommended activity: Activity as tolerated, Activity as tolerated and no driving for today and Ambulate in house. *  Please give a list of your current medications to your Primary Care Provider. *  Please update this list whenever your medications are discontinued, doses are      changed, or new medications (including over-the-counter products) are added. *  Please carry medication information at all times in case of emergency situations. These are general instructions for a healthy lifestyle:    No smoking/ No tobacco products/ Avoid exposure to second hand smoke  Surgeon General's Warning:  Quitting smoking now greatly reduces serious risk to your health. Obesity, smoking, and sedentary lifestyle greatly increases your risk for illness    A healthy diet, regular physical exercise & weight monitoring are important for maintaining a healthy lifestyle    You may be retaining fluid if you have a history of heart failure or if you experience any of the following symptoms:  Weight gain of 3 pounds or more overnight or 5 pounds in a week, increased swelling in our hands or feet or shortness of breath while lying flat in bed. Please call your doctor as soon as you notice any of these symptoms; do not wait until your next office visit. Recognize signs and symptoms of STROKE:    F-face looks uneven    A-arms unable to move or move unevenly    S-speech slurred or non-existent    T-time-call 911 as soon as signs and symptoms begin-DO NOT go       Back to bed or wait to see if you get better-TIME IS BRAIN. Warning Signs of HEART ATTACK     Call 911 if you have these symptoms:   Chest discomfort. Most heart attacks involve discomfort in the center of the chest that lasts more than a few minutes, or that goes away and comes back. It can feel like uncomfortable pressure, squeezing, fullness, or pain.  Discomfort in other areas of the upper body.  Symptoms can include pain or discomfort in one or both arms, the back, neck, jaw, or stomach.  Shortness of breath with or without chest discomfort.  Other signs may include breaking out in a cold sweat, nausea, or lightheadedness. Don't wait more than five minutes to call 911 - MINUTES MATTER! Fast action can save your life. Calling 911 is almost always the fastest way to get lifesaving treatment. Emergency Medical Services staff can begin treatment when they arrive -- up to an hour sooner than if someone gets to the hospital by car. The discharge information has been reviewed with the patient. The patient verbalized understanding. Discharge medications reviewed with the patient and appropriate educational materials and side effects teaching were provided.   ___________________________________________________________________________________________________________________________________

## 2018-07-31 NOTE — PROGRESS NOTES
Problem: Mobility Impaired (Adult and Pediatric) Goal: *Acute Goals and Plan of Care (Insert Text) Physical Therapy Goals Initiated 7/29/2018 and to be accomplished within 7 day(s) 1. Patient will move from supine to sit and sit to supine  in bed with modified independence. 2.  Patient will transfer from bed to chair and chair to bed with modified independence using the least restrictive device. 3.  Patient will perform sit to stand with supervision/set-up. 4.  Patient will ambulate with supervision/set-up for 300 feet with the least restrictive device. 5.  Patient will ascend/descend 5 stairs with 0 handrail(s) with modified independence. Outcome: Progressing Towards Goal 
physical Therapy TREATMENT Patient: Shadia Dewitt (48 y.o. female) Date: 7/31/2018 Diagnosis: Rectal bleed Anemia Transient hypotension GI BLEED Rectal bleed Procedure(s) (LRB): 
COLONOSCOPY (N/A) 4 Days Post-Op Precautions: Fall Chart, physical therapy assessment, plan of care and goals were reviewed. ASSESSMENT: 
Pt making continued progress with mobility, and standing balance slightly improved today. No LOB. VCs for safety as pt has accelerated sonya with decreased focus. Cont POC. Progression toward goals: 
[]      Improving appropriately and progressing toward goals [x]      Improving slowly and progressing toward goals 
[]      Not making progress toward goals and plan of care will be adjusted PLAN: 
Patient continues to benefit from skilled intervention to address the above impairments. Continue treatment per established plan of care. Discharge Recommendations:  Home Health Further Equipment Recommendations for Discharge:  N/A  
 
SUBJECTIVE:  
Patient stated  can you get me more coffee  OBJECTIVE DATA SUMMARY:  
Critical Behavior: 
Neurologic State: Alert Orientation Level: Oriented X4 Functional Mobility Training: 
Bed Mobility: 
Rolling: Independent Supine to Sit: Independent Sit to Supine: Independent Scooting: Independent Transfers: 
Sit to Stand: Supervision Stand to Sit: Supervision Balance: 
Sitting: Intact Standing: Intact Standing - Static: Good Standing - Dynamic : Fair Ambulation/Gait Training: 
Distance (ft): 500 Feet (ft) Assistive Device: Gait belt Ambulation - Level of Assistance: Stand-by assistance Gait Abnormalities: Decreased step clearance; Path deviations Base of Support: Narrowed Speed/Sia: Accelerated Pain: 
Pain Scale 1: Numeric (0 - 10) Pain Intensity 1: 0 Activity Tolerance:  
Good After treatment:  
[] Patient left in no apparent distress sitting up in chair 
[x] Patient left in no apparent distress in bed 
[x] Call bell left within reach 
[] Nursing notified 
[] Caregiver present 
[] Bed alarm activated Yaneli Umanzor PTA Time Calculation: 14 mins

## 2018-07-31 NOTE — PROGRESS NOTES
Case discussed with id Dr. Juan Antonio Malagon, + blood cx due to contamination, no need to be treated. Pt will be d/c home today. Pt agrees with the plan.

## 2018-08-01 LAB
C TRACH RRNA SPEC QL NAA+PROBE: NEGATIVE
N GONORRHOEA RRNA SPEC QL NAA+PROBE: NEGATIVE
SPECIMEN SOURCE: NORMAL

## 2018-08-02 LAB
BACTERIA SPEC CULT: NORMAL
SERVICE CMNT-IMP: NORMAL

## 2018-08-03 LAB
BACTERIA SPEC CULT: NORMAL
SERVICE CMNT-IMP: NORMAL

## 2018-08-04 LAB
BACTERIA SPEC CULT: NORMAL
BACTERIA SPEC CULT: NORMAL
SERVICE CMNT-IMP: NORMAL
SERVICE CMNT-IMP: NORMAL

## 2020-09-28 ENCOUNTER — HOSPITAL ENCOUNTER (EMERGENCY)
Age: 45
Discharge: HOME | End: 2020-09-29
Attending: EMERGENCY MEDICINE
Payer: MEDICAID

## 2020-09-28 ENCOUNTER — APPOINTMENT (OUTPATIENT)
Dept: GENERAL RADIOLOGY | Age: 45
End: 2020-09-28
Attending: EMERGENCY MEDICINE
Payer: MEDICAID
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
## 2020-09-28 DIAGNOSIS — S62.102A CLOSED FRACTURE OF BOTH WRISTS, INITIAL ENCOUNTER: Primary | ICD-10-CM

## 2020-09-28 DIAGNOSIS — F10.920 ALCOHOLIC INTOXICATION WITHOUT COMPLICATION (HCC): ICD-10-CM

## 2020-09-28 DIAGNOSIS — S62.101A CLOSED FRACTURE OF BOTH WRISTS, INITIAL ENCOUNTER: Primary | ICD-10-CM

## 2020-09-28 LAB
ALBUMIN SERPL-MCNC: 3.4 G/DL (ref 3.4–5)
ALBUMIN/GLOB SERPL: 1.2 {RATIO} (ref 0.8–1.7)
ALP SERPL-CCNC: 64 U/L (ref 45–117)
ALT SERPL-CCNC: 17 U/L (ref 13–56)
ANION GAP SERPL CALC-SCNC: 6 MMOL/L (ref 3–18)
AST SERPL-CCNC: 12 U/L (ref 10–38)
BASOPHILS # BLD: 0 K/UL (ref 0–0.1)
BASOPHILS NFR BLD: 0 % (ref 0–2)
BILIRUB SERPL-MCNC: 0.2 MG/DL (ref 0.2–1)
BUN SERPL-MCNC: 14 MG/DL (ref 7–18)
BUN/CREAT SERPL: 19 (ref 12–20)
CALCIUM SERPL-MCNC: 8.4 MG/DL (ref 8.5–10.1)
CHLORIDE SERPL-SCNC: 108 MMOL/L (ref 100–111)
CO2 SERPL-SCNC: 27 MMOL/L (ref 21–32)
CREAT SERPL-MCNC: 0.75 MG/DL (ref 0.6–1.3)
DIFFERENTIAL METHOD BLD: ABNORMAL
EOSINOPHIL # BLD: 0.1 K/UL (ref 0–0.4)
EOSINOPHIL NFR BLD: 2 % (ref 0–5)
ERYTHROCYTE [DISTWIDTH] IN BLOOD BY AUTOMATED COUNT: 16.4 % (ref 11.6–14.5)
ETHANOL SERPL-MCNC: 219 MG/DL (ref 0–3)
GLOBULIN SER CALC-MCNC: 2.9 G/DL (ref 2–4)
GLUCOSE SERPL-MCNC: 92 MG/DL (ref 74–99)
HCT VFR BLD AUTO: 35 % (ref 35–45)
HGB BLD-MCNC: 11.5 G/DL (ref 12–16)
LYMPHOCYTES # BLD: 3.9 K/UL (ref 0.9–3.6)
LYMPHOCYTES NFR BLD: 46 % (ref 21–52)
MCH RBC QN AUTO: 28.1 PG (ref 24–34)
MCHC RBC AUTO-ENTMCNC: 32.9 G/DL (ref 31–37)
MCV RBC AUTO: 85.6 FL (ref 74–97)
MONOCYTES # BLD: 0.7 K/UL (ref 0.05–1.2)
MONOCYTES NFR BLD: 8 % (ref 3–10)
NEUTS SEG # BLD: 3.7 K/UL (ref 1.8–8)
NEUTS SEG NFR BLD: 44 % (ref 40–73)
PLATELET # BLD AUTO: 265 K/UL (ref 135–420)
PMV BLD AUTO: 9.4 FL (ref 9.2–11.8)
POTASSIUM SERPL-SCNC: 4 MMOL/L (ref 3.5–5.5)
PROT SERPL-MCNC: 6.3 G/DL (ref 6.4–8.2)
RBC # BLD AUTO: 4.09 M/UL (ref 4.2–5.3)
SODIUM SERPL-SCNC: 141 MMOL/L (ref 136–145)
WBC # BLD AUTO: 8.6 K/UL (ref 4.6–13.2)

## 2020-09-28 PROCEDURE — 85025 COMPLETE CBC W/AUTO DIFF WBC: CPT

## 2020-09-28 PROCEDURE — 73110 X-RAY EXAM OF WRIST: CPT

## 2020-09-28 PROCEDURE — 74011250636 HC RX REV CODE- 250/636: Performed by: EMERGENCY MEDICINE

## 2020-09-28 PROCEDURE — 80307 DRUG TEST PRSMV CHEM ANLYZR: CPT

## 2020-09-28 PROCEDURE — 80053 COMPREHEN METABOLIC PANEL: CPT

## 2020-09-28 PROCEDURE — 96375 TX/PRO/DX INJ NEW DRUG ADDON: CPT

## 2020-09-28 PROCEDURE — 99285 EMERGENCY DEPT VISIT HI MDM: CPT

## 2020-09-28 PROCEDURE — 75810000053 HC SPLINT APPLICATION

## 2020-09-28 PROCEDURE — 96374 THER/PROPH/DIAG INJ IV PUSH: CPT

## 2020-09-28 RX ORDER — KETOROLAC TROMETHAMINE 30 MG/ML
30 INJECTION, SOLUTION INTRAMUSCULAR; INTRAVENOUS ONCE
Status: COMPLETED | OUTPATIENT
Start: 2020-09-28 | End: 2020-09-28

## 2020-09-28 RX ORDER — LORAZEPAM 2 MG/ML
2 INJECTION INTRAMUSCULAR ONCE
Status: COMPLETED | OUTPATIENT
Start: 2020-09-28 | End: 2020-09-28

## 2020-09-28 RX ADMIN — LORAZEPAM 2 MG: 2 INJECTION INTRAMUSCULAR; INTRAVENOUS at 22:51

## 2020-09-28 RX ADMIN — KETOROLAC TROMETHAMINE 30 MG: 30 INJECTION, SOLUTION INTRAMUSCULAR at 22:48

## 2020-09-29 VITALS
HEIGHT: 69 IN | BODY MASS INDEX: 20.73 KG/M2 | SYSTOLIC BLOOD PRESSURE: 114 MMHG | TEMPERATURE: 98 F | RESPIRATION RATE: 16 BRPM | HEART RATE: 72 BPM | DIASTOLIC BLOOD PRESSURE: 78 MMHG | WEIGHT: 140 LBS | OXYGEN SATURATION: 100 %

## 2020-09-29 PROCEDURE — 74011250637 HC RX REV CODE- 250/637: Performed by: EMERGENCY MEDICINE

## 2020-09-29 RX ORDER — OXYCODONE AND ACETAMINOPHEN 5; 325 MG/1; MG/1
1 TABLET ORAL ONCE
Status: DISCONTINUED | OUTPATIENT
Start: 2020-09-29 | End: 2020-09-29

## 2020-09-29 RX ORDER — NAPROXEN 500 MG/1
500 TABLET ORAL 2 TIMES DAILY WITH MEALS
Qty: 28 TAB | Refills: 0 | Status: SHIPPED | OUTPATIENT
Start: 2020-09-29 | End: 2020-10-13

## 2020-09-29 RX ORDER — HYDROCODONE BITARTRATE AND ACETAMINOPHEN 5; 325 MG/1; MG/1
1 TABLET ORAL ONCE
Status: COMPLETED | OUTPATIENT
Start: 2020-09-29 | End: 2020-09-29

## 2020-09-29 RX ORDER — HYDROCODONE BITARTRATE AND ACETAMINOPHEN 5; 325 MG/1; MG/1
1 TABLET ORAL
Qty: 20 TAB | Refills: 0 | Status: SHIPPED | OUTPATIENT
Start: 2020-09-29 | End: 2020-10-06

## 2020-09-29 RX ADMIN — HYDROCODONE BITARTRATE AND ACETAMINOPHEN 1 TABLET: 5; 325 TABLET ORAL at 06:08

## 2020-09-29 NOTE — ED NOTES
Patient refused to sign d/c papers and grabbed them from RN hand  I have reviewed discharge instructions with the patient. The patient verbalized understanding.   Patient refused to let RN remove arm band

## 2020-09-29 NOTE — ED NOTES
Patient ambulated to the bathroom with no difficulty.  Will attempt to get a ride home through [facility] if not able to call Mother: Cecilia Santos [phone]

## 2020-09-29 NOTE — DISCHARGE INSTRUCTIONS
You need to keep your wrists in the splints for at least one week. You have to follow up with an orthopedic surgeon for evaluation of your wrist fractures to determine if surgery is needed.
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
## 2020-09-29 NOTE — ED NOTES
RN was notified by Cece Crain in registration that patients ride was here. RN informed patient she stood up walked out of room with out shoes informed her that her shoes were not on she slid them on and was ambulatory out of the ed. RN provided her with her 2 prescriptions and d/c paper she states \"I don't want to hear all that just give them to me. \" per patient  Patient has splints to both arms. Patient given her bag of make up she carried the bag in her fingers. RN walked her to [facility] patient was steady upon ambulation.
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
## 2022-03-18 PROBLEM — F19.10 SUBSTANCE ABUSE (HCC): Status: ACTIVE | Noted: 2018-07-26

## 2022-03-18 PROBLEM — S62.102A CLOSED FRACTURE OF BOTH WRISTS: Status: ACTIVE | Noted: 2020-09-28

## 2022-03-18 PROBLEM — S62.101A CLOSED FRACTURE OF BOTH WRISTS: Status: ACTIVE | Noted: 2020-09-28
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
## 2022-03-19 PROBLEM — K62.5 RECTAL BLEED: Status: ACTIVE | Noted: 2018-07-25

## 2022-03-19 PROBLEM — D64.9 ANEMIA: Status: ACTIVE | Noted: 2018-07-25

## 2022-03-19 PROBLEM — F10.920 ALCOHOLIC INTOXICATION WITHOUT COMPLICATION (HCC): Status: ACTIVE | Noted: 2020-09-28

## 2022-03-19 PROBLEM — E83.39 HYPOPHOSPHATEMIA: Status: ACTIVE | Noted: 2018-07-27

## 2022-03-19 PROBLEM — L03.113 CELLULITIS OF RIGHT ARM: Status: ACTIVE | Noted: 2018-07-29

## 2022-03-20 PROBLEM — F17.200 SMOKER: Status: ACTIVE | Noted: 2018-07-26

## 2022-03-20 PROBLEM — E83.42 HYPOMAGNESEMIA: Status: ACTIVE | Noted: 2018-07-27

## 2022-03-20 PROBLEM — I95.9 TRANSIENT HYPOTENSION: Status: ACTIVE | Noted: 2018-07-25

## 2022-03-20 PROBLEM — N89.8 VAGINAL IRRITATION: Status: ACTIVE | Noted: 2018-07-29

## 2023-01-02 ENCOUNTER — APPOINTMENT (OUTPATIENT)
Dept: GENERAL RADIOLOGY | Age: 48
End: 2023-01-02
Attending: EMERGENCY MEDICINE
Payer: MEDICAID

## 2023-01-02 ENCOUNTER — HOSPITAL ENCOUNTER (EMERGENCY)
Age: 48
Discharge: HOME | End: 2023-01-02
Attending: EMERGENCY MEDICINE
Payer: MEDICAID

## 2023-01-02 VITALS
TEMPERATURE: 98 F | RESPIRATION RATE: 18 BRPM | WEIGHT: 130 LBS | HEART RATE: 85 BPM | HEIGHT: 69 IN | DIASTOLIC BLOOD PRESSURE: 71 MMHG | SYSTOLIC BLOOD PRESSURE: 120 MMHG | OXYGEN SATURATION: 98 % | BODY MASS INDEX: 19.26 KG/M2

## 2023-01-02 DIAGNOSIS — S20.219A CONTUSION OF CHEST WALL, UNSPECIFIED LATERALITY, INITIAL ENCOUNTER: Primary | ICD-10-CM

## 2023-01-02 PROCEDURE — 93005 ELECTROCARDIOGRAM TRACING: CPT

## 2023-01-02 PROCEDURE — 74011250637 HC RX REV CODE- 250/637: Performed by: EMERGENCY MEDICINE

## 2023-01-02 PROCEDURE — 99284 EMERGENCY DEPT VISIT MOD MDM: CPT

## 2023-01-02 PROCEDURE — 71045 X-RAY EXAM CHEST 1 VIEW: CPT

## 2023-01-02 RX ORDER — NAPROXEN SODIUM 220 MG
440 TABLET ORAL
Qty: 20 TABLET | Refills: 0 | Status: SHIPPED | OUTPATIENT
Start: 2023-01-02

## 2023-01-02 RX ORDER — METHOCARBAMOL 750 MG/1
750 TABLET, FILM COATED ORAL
Qty: 40 TABLET | Refills: 0 | Status: SHIPPED | OUTPATIENT
Start: 2023-01-02

## 2023-01-02 RX ORDER — HYDROCODONE BITARTRATE AND ACETAMINOPHEN 5; 325 MG/1; MG/1
1 TABLET ORAL
Status: COMPLETED | OUTPATIENT
Start: 2023-01-02 | End: 2023-01-02

## 2023-01-02 RX ORDER — METHOCARBAMOL 500 MG/1
1000 TABLET, FILM COATED ORAL ONCE
Status: COMPLETED | OUTPATIENT
Start: 2023-01-02 | End: 2023-01-02

## 2023-01-02 RX ADMIN — HYDROCODONE BITARTRATE AND ACETAMINOPHEN 1 TABLET: 5; 325 TABLET ORAL at 23:04

## 2023-01-02 RX ADMIN — METHOCARBAMOL 1000 MG: 500 TABLET ORAL at 23:05

## 2023-01-03 LAB
ATRIAL RATE: 92 BPM
CALCULATED P AXIS, ECG09: 67 DEGREES
CALCULATED R AXIS, ECG10: 67 DEGREES
CALCULATED T AXIS, ECG11: 64 DEGREES
DIAGNOSIS, 93000: NORMAL
P-R INTERVAL, ECG05: 126 MS
Q-T INTERVAL, ECG07: 378 MS
QRS DURATION, ECG06: 80 MS
QTC CALCULATION (BEZET), ECG08: 467 MS
VENTRICULAR RATE, ECG03: 92 BPM
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
## 2023-01-03 NOTE — ED NOTES
Patient observed with above complaint, not in any acute distress.  Hurts to breath, has abrasion to left side of chest

## 2023-02-15 ENCOUNTER — HOSPITAL ENCOUNTER (EMERGENCY)
Facility: HOSPITAL | Age: 48
Discharge: HOME | End: 2023-02-15
Attending: STUDENT IN AN ORGANIZED HEALTH CARE EDUCATION/TRAINING PROGRAM | Admitting: STUDENT IN AN ORGANIZED HEALTH CARE EDUCATION/TRAINING PROGRAM
Payer: COMMERCIAL

## 2023-02-15 VITALS
TEMPERATURE: 98.6 F | DIASTOLIC BLOOD PRESSURE: 85 MMHG | SYSTOLIC BLOOD PRESSURE: 128 MMHG | OXYGEN SATURATION: 98 % | HEART RATE: 74 BPM | RESPIRATION RATE: 18 BRPM

## 2023-02-15 DIAGNOSIS — R53.83 FATIGUE, UNSPECIFIED TYPE: Primary | ICD-10-CM

## 2023-02-15 DIAGNOSIS — Z78.9 ALCOHOL USE: ICD-10-CM

## 2023-02-15 PROCEDURE — 99282 EMERGENCY DEPT VISIT SF MDM: CPT | Performed by: PHYSICIAN ASSISTANT

## 2023-02-15 RX ORDER — ACETAMINOPHEN 325 MG/1
650 TABLET ORAL
Status: DISCONTINUED | OUTPATIENT
Start: 2023-02-15 | End: 2023-02-15 | Stop reason: HOSPADM

## 2023-02-15 NOTE — ED NOTES
Pt states that she feels tired after her walk and just wanted a place to sleep.  Pt resting with no current complaints     Melany Anders RN  02/15/23 5418

## 2023-02-15 NOTE — ED PROVIDER NOTES
[facility] EMERGENCY DEPT  EMERGENCY DEPARTMENT ENCOUNTER       Pt Name: Arti Wilson  MRN: [id number]  [place] 1975  Date of evaluation: 2/15/2023  Provider: Gokul Greer PA-C   PCP: Pcp No  Note Started: 8:46 PM 2/15/23     CHIEF COMPLAINT       Chief Complaint   Patient presents with    Fatigue     Pt states that she walked about 2 hours and is now tired and dizzy. HISTORY OF PRESENT ILLNESS: 1 or more elements      History From: Patient  HPI Limitations: none     Arti Wilson is a 46 y.o. female with hx of seizures, bipolar, tobacco use, marijuana use who presents c/o fatigue. Pt has been in ED for 3.5 hours. She walked to ED overnight from about 2 hours away. Pt was asleep with lights off upon me entering room. She came to ED as Luis Alberto Nita needed a place to rest.\" Pt admits to drinking 5 natural Ice 12 oz beers. Pt did not have money for uber and she did not want to ride with her friend who had been drinking. Pt states she feels much better after sleeping. She is A&O and answering questions. Pt notes headache last night but feeling better. She notes she has a two story house in [place] where she lives. Pt refused CT HEAD, labs, and IVF. Agreeable to Tylenol. Pt denies fever, chills, CP, SON, neck pain, numbness, weakness, blood thinner use, head trauma. Nursing Notes were all reviewed and agreed with or any disagreements were addressed in the HPI. PAST HISTORY     Past Medical History:  Past Medical History:   Diagnosis Date    Asthma     History of appendectomy     SAH ([address]arachnoid hemorrhage) ([facility]     Seizures ([facility] [address] [facility]     Substance abuse ([facility] [address] [facility]        Past Surgical History:  Past Surgical History:   Procedure Laterality Date    APPENDECTOMY       SECTION      COLONOSCOPY N/A 2018    COLONOSCOPY performed by Bonnie Galeano MD at [facility] ENDOSCOPY    TRACHEOSTOMY         Family History:  History reviewed. No pertinent family history.     Social History:  Social History Socioeconomic History    Marital status:      Spouse name: None    Number of children: None    Years of education: None    Highest education level: None   Tobacco Use    Smoking status: Every Day     Packs/day: 1.00     Types: Cigarettes    Smokeless tobacco: Never   Substance and Sexual Activity    Alcohol use: Yes    Drug use: Yes     Types: Marijuana (Min PigIMN), Other, Cocaine       Allergies:  No Known Allergies    CURRENT MEDICATIONS      No current facility-administered medications for this encounter. No current outpatient medications on file. PHYSICAL EXAM      Vitals:    02/15/23 0540   BP: 128/85   Pulse: 74   Resp: 18   Temp: 98.6 °F (37 °C)   SpO2: 98%     Physical Exam  Vitals and nursing note reviewed. Constitutional:       General: She is sleeping. Appearance: Normal appearance. Comments: Slightly disheveled  female asleep in dark room in fast track. Awakens to voice. HENT:      Head: Normocephalic and atraumatic. No raccoon eyes, Pablo's sign, right periorbital erythema or left periorbital erythema. Right Ear: No hemotympanum. Left Ear: No hemotympanum. Nose: No rhinorrhea. Mouth/Throat:      Mouth: Mucous membranes are dry. Pharynx: Oropharynx is clear. Eyes:      Extraocular Movements: Extraocular movements intact. Pupils: Pupils are equal, round, and reactive to light. Cardiovascular:      Rate and Rhythm: Normal rate and regular rhythm. Heart sounds: Normal heart sounds. No murmur heard. No friction rub. No gallop. Pulmonary:      Effort: Pulmonary effort is normal. No respiratory distress. Breath sounds: Normal breath sounds. No stridor. No wheezing or rhonchi. Musculoskeletal:         General: Normal range of motion. Cervical back: Normal range of motion and neck supple. Skin:     General: Skin is warm.    Neurological:      Mental Status: She is oriented to person, place, and time and easily aroused. GCS: GCS eye subscore is 4. GCS verbal subscore is 5. GCS motor subscore is 6. Sensory: Sensation is intact. Motor: Motor function is intact. Coordination: Coordination is intact. Psychiatric:         Behavior: Behavior normal. Behavior is cooperative. DIAGNOSTIC RESULTS   LABS:    No results found for this or any previous visit (from the past 24 hour(s)). Labs Reviewed - No data to display      EKG: When ordered, EKG's are interpreted by the Emergency Department Provider in the absence of a cardiologist.  Please see their note for interpretation of EKG. Read by me. RADIOLOGY:  Non-plain film images such as CT, Ultrasound and MRI are read by the radiologist. Plain radiographic images are visualized and preliminarily interpreted by the ED Provider with the below findings:       Read by me, pending review by radiologist.     Interpretation per the Radiologist below, if available at the time of this note:  No orders to display           PROCEDURES   Unless otherwise noted below, none  Procedures         Nicoleside and DIFFERENTIAL DIAGNOSIS/MDM   Vitals:    Vitals:    02/15/23 0540   BP: 128/85   Pulse: 74   Resp: 18   Temp: 98.6 °F (37 °C)   SpO2: 98%       Patient was given the following medications:  Medications - No data to display      CONSULTS: (Who and What was discussed)  None    Chronic Conditions: bipolar, ETOH, tobacco use,     Social Determinants affecting Dx or Tx: none, pt denies homelessness      Records Reviewed (source and summary): Old medical records. Nursing notes. Previous radiology studies. ED COURSE       Medial Decision Making:  Pt drank ETOH last night and walked to ED to find a place to rest. Pt was in ED for ~4 hours asleep before my shift started. Pt feels better after sleeping. Not clinically dehydrated. Pt has no complaints. Refused labs or imaging. A&Ox4.  Pt appropriate, polite, and cooperative. Hospital will Lyft patient home. Reasons to RTED discussed with pt. All questions answered. Pt feels comfortable going home at this time. Pt expressed understanding and she agrees with plan. FINAL IMPRESSION     1. Fatigue, unspecified type    2. Alcohol use            DISPOSITION/PLAN   DISPOSITION Decision To Discharge 02/15/2023 09:11:42 AM      Discharged       PATIENT REFERRED TO:  [facility] EMERGENCY DEPT  [address] [place] [address]  [phone]    As needed, If symptoms worsen       DISCHARGE MEDICATIONS:     Medication List      You have not been prescribed any medications. I am the Primary Clinician of Record. (Please note that parts of this dictation were completed with voice recognition software. Quite often unanticipated grammatical, syntax, homophones, and other interpretive errors are inadvertently transcribed by the computer software. Please disregards these errors.  Please excuse any errors that have escaped final proofreading.)       Kenny Benitez PA-C  02/15/23 7329

## 2023-02-15 NOTE — ED NOTES
Pt states that she walked about 2 hours and is now tired and dizzy.       Mason Demarco RN  02/15/23 6582

## 2023-02-15 NOTE — ED NOTES
Patient given discharge papers. Will call the house supervisor for transportation for the patient.       Luz Oakley RN  02/15/23 4929

## 2023-08-26 ENCOUNTER — HOSPITAL ENCOUNTER (EMERGENCY)
Facility: HOSPITAL | Age: 48
Discharge: HOME | End: 2023-08-27
Payer: COMMERCIAL

## 2023-08-26 DIAGNOSIS — L03.119 CELLULITIS AND ABSCESS OF LEG: Primary | ICD-10-CM

## 2023-08-26 DIAGNOSIS — L02.419 CELLULITIS AND ABSCESS OF LEG: Primary | ICD-10-CM

## 2023-08-26 LAB
BASOPHILS # BLD: 0.1 K/UL (ref 0–0.1)
BASOPHILS NFR BLD: 0 % (ref 0–2)
DIFFERENTIAL METHOD BLD: ABNORMAL
EOSINOPHIL # BLD: 0.3 K/UL (ref 0–0.4)
EOSINOPHIL NFR BLD: 3 % (ref 0–5)
ERYTHROCYTE [DISTWIDTH] IN BLOOD BY AUTOMATED COUNT: 16.3 % (ref 11.6–14.5)
HCT VFR BLD AUTO: 41 % (ref 35–45)
HGB BLD-MCNC: 13.3 G/DL (ref 12–16)
IMM GRANULOCYTES # BLD AUTO: 0 K/UL (ref 0–0.04)
IMM GRANULOCYTES NFR BLD AUTO: 0 % (ref 0–0.5)
LYMPHOCYTES # BLD: 2.4 K/UL (ref 0.9–3.6)
LYMPHOCYTES NFR BLD: 22 % (ref 21–52)
MCH RBC QN AUTO: 29.8 PG (ref 24–34)
MCHC RBC AUTO-ENTMCNC: 32.4 G/DL (ref 31–37)
MCV RBC AUTO: 91.7 FL (ref 78–100)
MONOCYTES # BLD: 0.8 K/UL (ref 0.05–1.2)
MONOCYTES NFR BLD: 8 % (ref 3–10)
NEUTS SEG # BLD: 7.6 K/UL (ref 1.8–8)
NEUTS SEG NFR BLD: 67 % (ref 40–73)
NRBC # BLD: 0 K/UL (ref 0–0.01)
NRBC BLD-RTO: 0 PER 100 WBC
PLATELET # BLD AUTO: 278 K/UL (ref 135–420)
PMV BLD AUTO: 9.8 FL (ref 9.2–11.8)
RBC # BLD AUTO: 4.47 M/UL (ref 4.2–5.3)
WBC # BLD AUTO: 11.2 K/UL (ref 4.6–13.2)

## 2023-08-26 PROCEDURE — 6360000002 HC RX W HCPCS: Performed by: NURSE PRACTITIONER

## 2023-08-26 PROCEDURE — 96366 THER/PROPH/DIAG IV INF ADDON: CPT

## 2023-08-26 PROCEDURE — 10061 I&D ABSCESS COMP/MULTIPLE: CPT

## 2023-08-26 PROCEDURE — 96365 THER/PROPH/DIAG IV INF INIT: CPT

## 2023-08-26 PROCEDURE — 80048 BASIC METABOLIC PNL TOTAL CA: CPT

## 2023-08-26 PROCEDURE — 96375 TX/PRO/DX INJ NEW DRUG ADDON: CPT

## 2023-08-26 PROCEDURE — 85025 COMPLETE CBC W/AUTO DIFF WBC: CPT

## 2023-08-26 PROCEDURE — 99284 EMERGENCY DEPT VISIT MOD MDM: CPT

## 2023-08-26 RX ORDER — MORPHINE SULFATE 4 MG/ML
4 INJECTION, SOLUTION INTRAMUSCULAR; INTRAVENOUS
Status: COMPLETED | OUTPATIENT
Start: 2023-08-26 | End: 2023-08-26

## 2023-08-26 RX ORDER — ONDANSETRON 2 MG/ML
4 INJECTION INTRAMUSCULAR; INTRAVENOUS ONCE
Status: COMPLETED | OUTPATIENT
Start: 2023-08-26 | End: 2023-08-26

## 2023-08-26 RX ADMIN — MORPHINE SULFATE 4 MG: 4 INJECTION, SOLUTION INTRAMUSCULAR; INTRAVENOUS at 23:56

## 2023-08-26 RX ADMIN — ONDANSETRON 4 MG: 2 INJECTION INTRAMUSCULAR; INTRAVENOUS at 23:57

## 2023-08-26 ASSESSMENT — PAIN DESCRIPTION - ORIENTATION: ORIENTATION: LOWER

## 2023-08-26 ASSESSMENT — PAIN - FUNCTIONAL ASSESSMENT: PAIN_FUNCTIONAL_ASSESSMENT: 0-10

## 2023-08-26 ASSESSMENT — PAIN SCALES - GENERAL
PAINLEVEL_OUTOF10: 10
PAINLEVEL_OUTOF10: 10

## 2023-08-26 ASSESSMENT — PAIN DESCRIPTION - LOCATION: LOCATION: LEG

## 2023-08-27 VITALS
TEMPERATURE: 98.2 F | HEIGHT: 69 IN | RESPIRATION RATE: 18 BRPM | OXYGEN SATURATION: 100 % | WEIGHT: 140 LBS | BODY MASS INDEX: 20.73 KG/M2 | DIASTOLIC BLOOD PRESSURE: 79 MMHG | SYSTOLIC BLOOD PRESSURE: 122 MMHG | HEART RATE: 67 BPM

## 2023-08-27 LAB
ANION GAP SERPL CALC-SCNC: 4 MMOL/L (ref 3–18)
BUN SERPL-MCNC: 11 MG/DL (ref 7–18)
BUN/CREAT SERPL: 13 (ref 12–20)
CALCIUM SERPL-MCNC: 8.9 MG/DL (ref 8.5–10.1)
CHLORIDE SERPL-SCNC: 105 MMOL/L (ref 100–111)
CO2 SERPL-SCNC: 30 MMOL/L (ref 21–32)
CREAT SERPL-MCNC: 0.82 MG/DL (ref 0.6–1.3)
GLUCOSE SERPL-MCNC: 103 MG/DL (ref 74–99)
POTASSIUM SERPL-SCNC: 4.3 MMOL/L (ref 3.5–5.5)
SODIUM SERPL-SCNC: 139 MMOL/L (ref 136–145)

## 2023-08-27 PROCEDURE — 6360000002 HC RX W HCPCS: Performed by: NURSE PRACTITIONER

## 2023-08-27 PROCEDURE — 96366 THER/PROPH/DIAG IV INF ADDON: CPT

## 2023-08-27 PROCEDURE — 2580000003 HC RX 258: Performed by: NURSE PRACTITIONER

## 2023-08-27 PROCEDURE — 87147 CULTURE TYPE IMMUNOLOGIC: CPT

## 2023-08-27 PROCEDURE — 87205 SMEAR GRAM STAIN: CPT

## 2023-08-27 PROCEDURE — 10061 I&D ABSCESS COMP/MULTIPLE: CPT

## 2023-08-27 PROCEDURE — 87070 CULTURE OTHR SPECIMN AEROBIC: CPT

## 2023-08-27 PROCEDURE — 96365 THER/PROPH/DIAG IV INF INIT: CPT

## 2023-08-27 PROCEDURE — 87077 CULTURE AEROBIC IDENTIFY: CPT

## 2023-08-27 PROCEDURE — 87186 SC STD MICRODIL/AGAR DIL: CPT

## 2023-08-27 RX ORDER — IBUPROFEN 800 MG/1
800 TABLET ORAL EVERY 8 HOURS PRN
Qty: 30 TABLET | Refills: 0 | Status: SHIPPED | OUTPATIENT
Start: 2023-08-27 | End: 2023-09-06

## 2023-08-27 RX ORDER — DOXYCYCLINE HYCLATE 100 MG
100 TABLET ORAL 2 TIMES DAILY
Qty: 14 TABLET | Refills: 0 | Status: SHIPPED | OUTPATIENT
Start: 2023-08-27 | End: 2023-09-03

## 2023-08-27 RX ADMIN — VANCOMYCIN HYDROCHLORIDE 750 MG: 750 INJECTION, POWDER, LYOPHILIZED, FOR SOLUTION INTRAVENOUS at 00:00

## 2023-08-27 ASSESSMENT — PAIN - FUNCTIONAL ASSESSMENT: PAIN_FUNCTIONAL_ASSESSMENT: 0-10

## 2023-08-27 ASSESSMENT — PAIN SCALES - GENERAL: PAINLEVEL_OUTOF10: 8

## 2023-08-27 NOTE — ED TRIAGE NOTES
Pt presents to ED ambulatory for c/o infection to wound on lt lower leg x yesterday, possibly a spider bite. Initially small in size, noticeably more severe today +bleeding +pus drainage +redness +warmth +itching. Pt denies fevers.     No meds taken PTA

## 2023-08-27 NOTE — ED NOTES
Patient refuses to be on the monitor.  Patient was educated on the risks that comes along with that     Leslie Gamboa RN  08/27/23 8030

## 2023-08-27 NOTE — ED PROVIDER NOTES
[facility] EMERGENCY DEPT  EMERGENCY DEPARTMENT ENCOUNTER       Pt Name: Neri Killian  MRN: [id number]  [address] 1975  Date of evaluation: 2023  PCP: Pcp No  Note Started: 1:23 AM 23     CHIEF COMPLAINT       Chief Complaint   Patient presents with    Wound Infection     Lt lower leg        HISTORY OF PRESENT ILLNESS: 1 or more elements      History From: Patient  HPI Limitations: None  Chronic Conditions: Bipolar disorder, seizure disorder, substance abuse, anemia, asthma  Social Determinants affecting Dx or Tx: None    Neri Killian is a 46 y.o. female who presents to ED c/o pain, erythema and drainage to right lower leg. Patient reports she first noticed a lesion to her L shin yesterday. Patient states it was initially \"the size of the mosquito bite,\" no known bite or injury. Patient reports increased rapidly in size today with spreading warmth and tenderness, purulent drainage and bleeding. Patient denies fever/chills, headache, dizziness, lightheadedness, chest pain, shortness of breath, focal weakness, paresthesias, nausea/vomiting. Nursing Notes were all reviewed and agreed with or any disagreements were addressed in the HPI. PAST HISTORY     Past Medical History:  Past Medical History:   Diagnosis Date    Asthma     History of appendectomy     SAH (subarachnoid hemorrhage) ([address])     Seizures ([address])     Substance abuse ([address])        Past Surgical History:  Past Surgical History:   Procedure Laterality Date    APPENDECTOMY       SECTION      COLONOSCOPY N/A 2018    COLONOSCOPY performed by Carla Baker MD at [address]         Family History:  No family history on file.     Social History:  Social History     Socioeconomic History    Marital status:    Tobacco Use    Smoking status: Every Day     Packs/day: 1.00     Types: Cigarettes    Smokeless tobacco: Never   Substance and Sexual Activity    Alcohol use: Yes    Drug use: Yes     Types:

## 2023-08-27 NOTE — DISCHARGE INSTRUCTIONS
Antibiotics as prescribed, take all unless instructed not to by medical professional  Keep area elevated is much as possible  Warm soaks/compresses 4 times a day as discussed to encourage drainage  May use Tylenol OTC according to package directions and/or ibuprofen as prescribed for pain  Return in 48 hours for wound check  Return to care for new or worsening symptoms to include spreading redness/warmth, streaking, fever/chills, paresthesias or other concerning symptoms

## 2023-08-29 ENCOUNTER — HOSPITAL ENCOUNTER (EMERGENCY)
Facility: HOSPITAL | Age: 48
Discharge: HOME | End: 2023-08-29
Attending: EMERGENCY MEDICINE
Payer: COMMERCIAL

## 2023-08-29 VITALS
DIASTOLIC BLOOD PRESSURE: 75 MMHG | HEART RATE: 83 BPM | OXYGEN SATURATION: 100 % | RESPIRATION RATE: 16 BRPM | SYSTOLIC BLOOD PRESSURE: 111 MMHG | TEMPERATURE: 97.2 F

## 2023-08-29 DIAGNOSIS — L03.119 CELLULITIS AND ABSCESS OF LEG: Primary | ICD-10-CM

## 2023-08-29 DIAGNOSIS — L02.419 CELLULITIS AND ABSCESS OF LEG: Primary | ICD-10-CM

## 2023-08-29 LAB
BACTERIA SPEC CULT: ABNORMAL
BACTERIA SPEC CULT: ABNORMAL
GRAM STN SPEC: ABNORMAL
GRAM STN SPEC: ABNORMAL
SERVICE CMNT-IMP: ABNORMAL

## 2023-08-29 PROCEDURE — 99282 EMERGENCY DEPT VISIT SF MDM: CPT

## 2023-08-29 NOTE — ED PROVIDER NOTES
[facility] EMERGENC[facility] DEPT  EMERGENCY DEPARTMENT ENCOUNTER    Patient Name: Gustavo Bui  MRN: [id number]  YOB: 1975  Provider: Anahi Rosales MD  PCP: Pcp No   Time/Date of evaluation: 1:58 PM EDT on 23    History of Presenting Illness     Chief Complaint   Patient presents with    Wound Check       History Provided By: Patient     History Wolfgang Stanley):   Gustavo Bui is a 46 y.o. female who presents to the emergency department patient was seen here on the  after she thinks she was bitten by a bug while walking. She had abscess and cellulitis of the left shin area mid lower leg. She was asked to come back for wound check. Nursing Notes were all reviewed and agreed with or any disagreements were addressed in the HPI. Past History     Past Medical History:  Past Medical History:   Diagnosis Date    Asthma     History of appendectomy     SAH (subarachnoid hemorrhage) ([address])     Seizures ([address])     Substance abuse ([facility])        Past Surgical History:  Past Surgical History:   Procedure Laterality Date    APPENDECTOMY       SECTION      COLONOSCOPY N/A 2018    COLONOSCOPY performed by Yareli Osborne MD at [address]         Family History:  No family history on file. Social History:  Social History     Tobacco Use    Smoking status: Every Day     Packs/day: 1.00     Types: Cigarettes    Smokeless tobacco: Never   Substance Use Topics    Alcohol use: Yes    Drug use: Yes     Types: Marijuana (Richard Newell), Other, Cocaine       Medications:  No current facility-administered medications for this encounter.      Current Outpatient Medications   Medication Sig Dispense Refill    doxycycline hyclate (VIBRA-TABS) 100 MG tablet Take 1 tablet by mouth 2 times daily for 7 days 14 tablet 0    ibuprofen (ADVIL;MOTRIN) 800 MG tablet Take 1 tablet by mouth every 8 hours as needed for Pain 30 tablet 0    methocarbamol (ROBAXIN) 750 MG tablet Take 750 mg by mouth 4 times daily as

## 2023-08-29 NOTE — ED TRIAGE NOTES
Patient here for wound recheck. Was instructed to come back in for recheck of cellulitis of left leg.

## 2024-02-12 ENCOUNTER — HOSPITAL ENCOUNTER (EMERGENCY)
Facility: HOSPITAL | Age: 49
Discharge: HOME | End: 2024-02-12
Payer: MEDICAID

## 2024-02-12 VITALS
BODY MASS INDEX: 19.2 KG/M2 | SYSTOLIC BLOOD PRESSURE: 136 MMHG | HEART RATE: 88 BPM | RESPIRATION RATE: 16 BRPM | DIASTOLIC BLOOD PRESSURE: 82 MMHG | TEMPERATURE: 98.1 F | WEIGHT: 130 LBS | OXYGEN SATURATION: 99 %

## 2024-02-12 DIAGNOSIS — L29.9 PRURITIC DISORDER: ICD-10-CM

## 2024-02-12 DIAGNOSIS — R21 RASH AND OTHER NONSPECIFIC SKIN ERUPTION: Primary | ICD-10-CM

## 2024-02-12 PROCEDURE — 99283 EMERGENCY DEPT VISIT LOW MDM: CPT

## 2024-02-12 RX ORDER — HYDROXYZINE HYDROCHLORIDE 25 MG/1
25 TABLET, FILM COATED ORAL EVERY 8 HOURS PRN
Qty: 30 TABLET | Refills: 0 | Status: SHIPPED | OUTPATIENT
Start: 2024-02-12 | End: 2024-02-12

## 2024-02-12 RX ORDER — HYDROXYZINE HYDROCHLORIDE 25 MG/1
25 TABLET, FILM COATED ORAL EVERY 8 HOURS PRN
Qty: 30 TABLET | Refills: 0 | Status: SHIPPED | OUTPATIENT
Start: 2024-02-12 | End: 2024-02-22

## 2024-02-12 NOTE — ED TRIAGE NOTES
Pt states that she had had a buck with bed bugs for months and would like the Er to help her. Pt has no complains there than feeling itchy from bites on her arms.

## 2024-02-12 NOTE — ED PROVIDER NOTES
[facility] EMERGENCY DEPT  EMERGENCY DEPARTMENT ENCOUNTER       Pt Name: Roxie Ledbetter  MRN: [id number]  Birthdate 1975  Date of evaluation: 2024  PCP: Neelam, Pcp  Note Started: 8:18 AM 24     CHIEF COMPLAINT       Chief Complaint   Patient presents with    Bed bug complaints        HISTORY OF PRESENT ILLNESS: 1 or more elements      History From: Patient  HPI Limitations: None  Chronic Conditions: Asthma, seizures, substance abuse, bipolar disorder  Social Determinants affecting Dx or Tx: None      Roxie Ledbetter is a 49 y.o. female who presents to ED c/o pruritic \"bites\" to her arms and legs.  Patient reports that she believes her residence is infested with bedbugs, states that they have had the home exterminated but she is still having pruritus and is requesting a shampoo to \"kill the bedbugs.\"  Patient denies additional complaints, specifically denies fever or chills, recent travel, new exposures to personal care products.     Nursing Notes were all reviewed and agreed with or any disagreements were addressed in the HPI.    PAST HISTORY     Past Medical History:  Past Medical History:   Diagnosis Date    Asthma     History of appendectomy     SAH (subarachnoid hemorrhage) ([facility])     Seizures (H[facility])     Substance abuse (HCC)        Past Surgical History:  Past Surgical History:   Procedure Laterality Date    APPENDECTOMY       SECTION      COLONOSCOPY N/A 2018    COLONOSCOPY performed by SHILOH Aranda MD at [facility] ENDOSCOPY    TRACHEOSTOMY         Family History:  No family history on file.    Social History:  Social History     Socioeconomic History    Marital status:    Tobacco Use    Smoking status: Every Day     Current packs/day: 1.00     Types: Cigarettes    Smokeless tobacco: Never   Substance and Sexual Activity    Alcohol use: Yes    Drug use: Yes     Types: Marijuana (Weed), Other, Cocaine       Allergies:  No Known Allergies    CURRENT MEDICATIONS      No current

## 2024-02-23 ENCOUNTER — APPOINTMENT (OUTPATIENT)
Facility: HOSPITAL | Age: 49
End: 2024-02-23
Payer: MEDICAID

## 2024-02-23 ENCOUNTER — HOSPITAL ENCOUNTER (EMERGENCY)
Facility: HOSPITAL | Age: 49
Discharge: HOME | End: 2024-02-23
Payer: MEDICAID

## 2024-02-23 VITALS
DIASTOLIC BLOOD PRESSURE: 103 MMHG | SYSTOLIC BLOOD PRESSURE: 161 MMHG | TEMPERATURE: 97 F | HEART RATE: 74 BPM | OXYGEN SATURATION: 99 % | RESPIRATION RATE: 16 BRPM
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
## 2024-02-23 DIAGNOSIS — F10.920 ACUTE ALCOHOLIC INTOXICATION WITHOUT COMPLICATION (HCC): Primary | ICD-10-CM

## 2024-02-23 DIAGNOSIS — S81.012A KNEE LACERATION, LEFT, INITIAL ENCOUNTER: ICD-10-CM

## 2024-02-23 DIAGNOSIS — T07.XXXA MULTIPLE ABRASIONS: ICD-10-CM

## 2024-02-23 LAB
ALBUMIN SERPL-MCNC: 3.9 G/DL (ref 3.4–5)
ALBUMIN/GLOB SERPL: 1.1 (ref 0.8–1.7)
ALP SERPL-CCNC: 64 U/L (ref 45–117)
ALT SERPL-CCNC: 20 U/L (ref 13–56)
ANION GAP SERPL CALC-SCNC: 8 MMOL/L (ref 3–18)
AST SERPL-CCNC: 17 U/L (ref 10–38)
BASOPHILS # BLD: 0 K/UL (ref 0–0.1)
BASOPHILS NFR BLD: 1 % (ref 0–2)
BILIRUB SERPL-MCNC: 0.5 MG/DL (ref 0.2–1)
BUN SERPL-MCNC: 11 MG/DL (ref 7–18)
BUN/CREAT SERPL: 17 (ref 12–20)
CALCIUM SERPL-MCNC: 9 MG/DL (ref 8.5–10.1)
CHLORIDE SERPL-SCNC: 110 MMOL/L (ref 100–111)
CO2 SERPL-SCNC: 23 MMOL/L (ref 21–32)
CREAT SERPL-MCNC: 0.63 MG/DL (ref 0.6–1.3)
DIFFERENTIAL METHOD BLD: NORMAL
EOSINOPHIL # BLD: 0.1 K/UL (ref 0–0.4)
EOSINOPHIL NFR BLD: 1 % (ref 0–5)
ERYTHROCYTE [DISTWIDTH] IN BLOOD BY AUTOMATED COUNT: 14.2 % (ref 11.6–14.5)
ETHANOL SERPL-MCNC: 210 MG/DL (ref 0–3)
GLOBULIN SER CALC-MCNC: 3.4 G/DL (ref 2–4)
GLUCOSE SERPL-MCNC: 77 MG/DL (ref 74–99)
HCT VFR BLD AUTO: 44 % (ref 35–45)
HGB BLD-MCNC: 15 G/DL (ref 12–16)
IMM GRANULOCYTES # BLD AUTO: 0 K/UL (ref 0–0.04)
IMM GRANULOCYTES NFR BLD AUTO: 0 % (ref 0–0.5)
LYMPHOCYTES # BLD: 3.3 K/UL (ref 0.9–3.6)
LYMPHOCYTES NFR BLD: 45 % (ref 21–52)
MAGNESIUM SERPL-MCNC: 2.2 MG/DL (ref 1.6–2.6)
MCH RBC QN AUTO: 31.5 PG (ref 24–34)
MCHC RBC AUTO-ENTMCNC: 34.1 G/DL (ref 31–37)
MCV RBC AUTO: 92.4 FL (ref 78–100)
MONOCYTES # BLD: 0.5 K/UL (ref 0.05–1.2)
MONOCYTES NFR BLD: 6 % (ref 3–10)
NEUTS SEG # BLD: 3.5 K/UL (ref 1.8–8)
NEUTS SEG NFR BLD: 47 % (ref 40–73)
NRBC # BLD: 0 K/UL (ref 0–0.01)
NRBC BLD-RTO: 0 PER 100 WBC
PLATELET # BLD AUTO: 375 K/UL (ref 135–420)
PMV BLD AUTO: 9.4 FL (ref 9.2–11.8)
POTASSIUM SERPL-SCNC: 3.5 MMOL/L (ref 3.5–5.5)
PROT SERPL-MCNC: 7.3 G/DL (ref 6.4–8.2)
RBC # BLD AUTO: 4.76 M/UL (ref 4.2–5.3)
SODIUM SERPL-SCNC: 141 MMOL/L (ref 136–145)
WBC # BLD AUTO: 7.4 K/UL (ref 4.6–13.2)

## 2024-02-23 PROCEDURE — 73100 X-RAY EXAM OF WRIST: CPT

## 2024-02-23 PROCEDURE — 90715 TDAP VACCINE 7 YRS/> IM: CPT | Performed by: EMERGENCY MEDICINE

## 2024-02-23 PROCEDURE — 73562 X-RAY EXAM OF KNEE 3: CPT

## 2024-02-23 PROCEDURE — 80053 COMPREHEN METABOLIC PANEL: CPT

## 2024-02-23 PROCEDURE — 96374 THER/PROPH/DIAG INJ IV PUSH: CPT

## 2024-02-23 PROCEDURE — 12001 RPR S/N/AX/GEN/TRNK 2.5CM/<: CPT

## 2024-02-23 PROCEDURE — 83735 ASSAY OF MAGNESIUM: CPT

## 2024-02-23 PROCEDURE — 70450 CT HEAD/BRAIN W/O DYE: CPT

## 2024-02-23 PROCEDURE — 72125 CT NECK SPINE W/O DYE: CPT

## 2024-02-23 PROCEDURE — 90471 IMMUNIZATION ADMIN: CPT | Performed by: EMERGENCY MEDICINE

## 2024-02-23 PROCEDURE — 6360000002 HC RX W HCPCS: Performed by: EMERGENCY MEDICINE

## 2024-02-23 PROCEDURE — 6360000004 HC RX CONTRAST MEDICATION: Performed by: EMERGENCY MEDICINE

## 2024-02-23 PROCEDURE — 73110 X-RAY EXAM OF WRIST: CPT

## 2024-02-23 PROCEDURE — 96372 THER/PROPH/DIAG INJ SC/IM: CPT

## 2024-02-23 PROCEDURE — 70486 CT MAXILLOFACIAL W/O DYE: CPT

## 2024-02-23 PROCEDURE — 71260 CT THORAX DX C+: CPT

## 2024-02-23 PROCEDURE — 99285 EMERGENCY DEPT VISIT HI MDM: CPT

## 2024-02-23 PROCEDURE — 82077 ASSAY SPEC XCP UR&BREATH IA: CPT

## 2024-02-23 PROCEDURE — 85025 COMPLETE CBC W/AUTO DIFF WBC: CPT

## 2024-02-23 RX ORDER — MIDAZOLAM HYDROCHLORIDE 2 MG/2ML
2 INJECTION, SOLUTION INTRAMUSCULAR; INTRAVENOUS ONCE
Status: COMPLETED | OUTPATIENT
Start: 2024-02-23 | End: 2024-02-23

## 2024-02-23 RX ORDER — GINSENG 100 MG
CAPSULE ORAL
Status: DISCONTINUED | OUTPATIENT
Start: 2024-02-23 | End: 2024-02-24 | Stop reason: HOSPADM

## 2024-02-23 RX ORDER — MIDAZOLAM HYDROCHLORIDE 2 MG/2ML
1 INJECTION, SOLUTION INTRAMUSCULAR; INTRAVENOUS ONCE
Status: DISCONTINUED | OUTPATIENT
Start: 2024-02-23 | End: 2024-02-23

## 2024-02-23 RX ORDER — DIPHENHYDRAMINE HYDROCHLORIDE 50 MG/ML
50 INJECTION INTRAMUSCULAR; INTRAVENOUS
Status: COMPLETED | OUTPATIENT
Start: 2024-02-23 | End: 2024-02-23

## 2024-02-23 RX ADMIN — MIDAZOLAM HYDROCHLORIDE 2 MG: 1 INJECTION, SOLUTION INTRAMUSCULAR; INTRAVENOUS at 15:18

## 2024-02-23 RX ADMIN — TETANUS TOXOID, REDUCED DIPHTHERIA TOXOID AND ACELLULAR PERTUSSIS VACCINE, ADSORBED 0.5 ML: 5; 2.5; 8; 8; 2.5 SUSPENSION INTRAMUSCULAR at 14:46

## 2024-02-23 RX ADMIN — DIPHENHYDRAMINE HYDROCHLORIDE 50 MG: 50 INJECTION INTRAMUSCULAR; INTRAVENOUS at 14:47

## 2024-02-23 RX ADMIN — IOPAMIDOL 100 ML: 612 INJECTION, SOLUTION INTRAVENOUS at 19:25

## 2024-02-23 ASSESSMENT — ENCOUNTER SYMPTOMS
RESPIRATORY NEGATIVE: 1
EYES NEGATIVE: 1
ALLERGIC/IMMUNOLOGIC NEGATIVE: 1
GASTROINTESTINAL NEGATIVE: 1

## 2024-02-23 NOTE — ED PROVIDER NOTES
identity confirmed:  Verbally with patient  Anesthesia:     Anesthesia method:  Local infiltration    Local anesthetic:  Lidocaine 1% w/o epi  Laceration details:     Location:  Leg    Leg location:  L knee    Length (cm):  1.5  Pre-procedure details:     Preparation:  Patient was prepped and draped in usual sterile fashion  Exploration:     Imaging obtained: x-ray      Imaging outcome: foreign body not noted      Contaminated: no    Treatment:     Area cleansed with:  Chlorhexidine    Irrigation solution:  Sterile saline    Irrigation volume:  50c    Irrigation method:  Syringe  Skin repair:     Repair method:  Sutures    Suture size:  4-0    Suture material:  Nylon    Suture technique:  Simple interrupted    Number of sutures:  4  Approximation:     Approximation:  Loose  Repair type:     Repair type:  Complex  Post-procedure details:     Dressing:  Antibiotic ointment, bulky dressing and splint for protection    Procedure completion:  Tolerated well, no immediate complications        FINAL IMPRESSION      1. Acute alcoholic intoxication without complication (HCC)    2. Multiple abrasions    3. Knee laceration, left, initial encounter    4. Fall, initial encounter          DISPOSITION/PLAN   DISPOSITION Decision To Discharge 02/23/2024 08:28:03 PM      PATIENT REFERRED TO:  [address] [address],   Phone: [phone]  Schedule an appointment as soon as possible for a visit   As soon as possible, For follow up from the Emergency Department    [facility] EMERGENCY DEPT  [address] [facility] [address] [phone]    If symptoms worsen or unable to obtain follow up, return immediately      DISCHARGE MEDICATIONS:  Discharge Medication List as of 2/23/2024  9:36 PM        Controlled Substances Monitoring:          No data to display                (Please note that portions of this note were completed with a voice recognition program.  Efforts were made to edit the dictations

## 2024-02-23 NOTE — ED TRIAGE NOTES
Pt arrived by  ems. Sts she was sitting on the side of an overpass in the rain and fell. Landed on her knees and bilat wrists. Complaining of pain in bilat wrists pain. Has abrasions and approx half inch laceration to left knee. Bleeding controled. Pt sts etoh today - sts 2.5 beers. Pt sts one \"  Joint\" of fidel. Pt speech is slurred, gait is unsteady. Keeps saying that she wants pain medicine and to go home.

## 2024-02-24 NOTE — DISCHARGE INSTRUCTIONS
Please wear the knee immobilizer to prevent wound dehiscence.    Wound care should not involve any scrubbing but gentle dilution of any dried blood on the tissue planes.      Sutures to be removed in 2 weeks.

## 2025-08-09 ENCOUNTER — HOSPITAL ENCOUNTER (EMERGENCY)
Facility: HOSPITAL | Age: 50
Discharge: HOME | End: 2025-08-09
Payer: MEDICAID

## 2025-08-09 ENCOUNTER — APPOINTMENT (OUTPATIENT)
Facility: HOSPITAL | Age: 50
End: 2025-08-09
Payer: MEDICAID

## 2025-08-09 VITALS
TEMPERATURE: 97 F | DIASTOLIC BLOOD PRESSURE: 96 MMHG | SYSTOLIC BLOOD PRESSURE: 119 MMHG | RESPIRATION RATE: 18 BRPM | OXYGEN SATURATION: 98 % | BODY MASS INDEX: 17.77 KG/M2 | HEIGHT: 69 IN | WEIGHT: 120 LBS | HEART RATE: 87 BPM

## 2025-08-09 DIAGNOSIS — T14.8XXA: Primary | ICD-10-CM

## 2025-08-09 PROCEDURE — 73630 X-RAY EXAM OF FOOT: CPT

## 2025-08-09 PROCEDURE — 99283 EMERGENCY DEPT VISIT LOW MDM: CPT

## 2025-08-09 PROCEDURE — 6370000000 HC RX 637 (ALT 250 FOR IP): Performed by: PHYSICIAN ASSISTANT

## 2025-08-09 RX ORDER — ONDANSETRON 4 MG/1
4 TABLET, ORALLY DISINTEGRATING ORAL
Status: COMPLETED | OUTPATIENT
Start: 2025-08-09 | End: 2025-08-09

## 2025-08-09 RX ORDER — ACETAMINOPHEN 500 MG
1000 TABLET ORAL
Status: COMPLETED | OUTPATIENT
Start: 2025-08-09 | End: 2025-08-09

## 2025-08-09 RX ORDER — IBUPROFEN 400 MG/1
400 TABLET, FILM COATED ORAL
Status: COMPLETED | OUTPATIENT
Start: 2025-08-09 | End: 2025-08-09

## 2025-08-09 RX ADMIN — ACETAMINOPHEN 1000 MG: 500 TABLET ORAL at 11:54

## 2025-08-09 RX ADMIN — IBUPROFEN 400 MG: 400 TABLET, FILM COATED ORAL at 11:54

## 2025-08-09 RX ADMIN — ONDANSETRON 4 MG: 4 TABLET, ORALLY DISINTEGRATING ORAL at 11:54

## (undated) DEVICE — SET ADMIN 16ML TBNG L100IN 2 Y INJ SITE IV PIGGY BK DISP

## (undated) DEVICE — SPONGE GZ W4XL4IN RAYON POLY 4 PLY NONWOVEN FASTER WICKING

## (undated) DEVICE — SYRINGE 50ML E/T

## (undated) DEVICE — ENDO CARRY-ON PROCEDURE KIT INCLUDES ENZYMATIC SPONGE, GAUZE, BIOHAZARD LABEL, TRAY, LUBRICANT, DIRTY SCOPE LABEL, WATER LABEL, TRAY, DRAWSTRING PAD, AND DEFENDO 4-PIECE KIT.: Brand: ENDO CARRY-ON PROCEDURE KIT

## (undated) DEVICE — CANNULA CUSH AD W/ 14FT TBG

## (undated) DEVICE — SINGLE PORT MANIFOLD: Brand: NEPTUNE 2

## (undated) DEVICE — NDL FLTR TIP 5 MIC 18GX1.5IN --

## (undated) DEVICE — SOLUTION IV 500ML 0.9% SOD CHL FLX CONT

## (undated) DEVICE — SYR 5ML 1/5 GRAD LL NSAF LF --

## (undated) DEVICE — CATH SUC CTRL PRT TRIFLO 14FR --

## (undated) DEVICE — MEDI-VAC NON-CONDUCTIVE SUCTION TUBING: Brand: CARDINAL HEALTH

## (undated) DEVICE — TRNQT TEXT 1X18IN BLU LF DISP -- CONVERT TO ITEM 362165

## (undated) DEVICE — TRAP SPEC COLL POLYP POLYSTYR --

## (undated) DEVICE — KENDALL RADIOLUCENT FOAM MONITORING ELECTRODE RECTANGULAR SHAPE: Brand: KENDALL

## (undated) DEVICE — WRISTBAND ID AD W2.5XL9.5CM RED VYN ADH CLSR UNI-PRINT

## (undated) DEVICE — SYR 3ML LL TIP 1/10ML GRAD --

## (undated) DEVICE — NDL PRT INJ NSAF BLNT 18GX1.5 --

## (undated) DEVICE — SPONGE GZ W4XL4IN COT 12 PLY TYP VII WVN C FLD DSGN

## (undated) DEVICE — CATH IV SAFE STR 22GX1IN BLU -- PROTECTIV PLUS

## (undated) DEVICE — MAJ-1414 SINGLE USE ADPATER BIOPSY VALV: Brand: SINGLE USE ADAPTOR BIOPSY VALVE